# Patient Record
Sex: FEMALE | Race: WHITE | NOT HISPANIC OR LATINO | Employment: OTHER | URBAN - METROPOLITAN AREA
[De-identification: names, ages, dates, MRNs, and addresses within clinical notes are randomized per-mention and may not be internally consistent; named-entity substitution may affect disease eponyms.]

---

## 2019-08-21 PROBLEM — S41.109A ARM WOUND: Status: ACTIVE | Noted: 2018-04-18

## 2019-08-21 PROBLEM — Z71.3 DIETARY COUNSELING: Status: ACTIVE | Noted: 2019-08-21

## 2019-08-21 PROBLEM — Z51.81 ENCOUNTER FOR THERAPEUTIC DRUG MONITORING: Status: ACTIVE | Noted: 2019-08-21

## 2019-12-31 PROBLEM — R31.0 GROSS HEMATURIA: Status: ACTIVE | Noted: 2019-12-31

## 2020-02-24 PROBLEM — S41.109A ARM WOUND: Status: RESOLVED | Noted: 2018-04-18 | Resolved: 2020-02-24

## 2020-02-24 PROBLEM — C67.9 UROTHELIAL CARCINOMA OF BLADDER: Status: ACTIVE | Noted: 2020-01-03

## 2020-03-17 PROBLEM — C67.9 MALIGNANT NEOPLASM OF URINARY BLADDER: Status: ACTIVE | Noted: 2020-03-17

## 2020-03-18 PROBLEM — R50.9 FEVER: Status: RESOLVED | Noted: 2020-03-18 | Resolved: 2020-03-18

## 2020-03-18 PROBLEM — R50.9 FEVER: Status: ACTIVE | Noted: 2020-03-18

## 2020-04-05 PROBLEM — R73.9 HYPERGLYCEMIA: Status: ACTIVE | Noted: 2020-04-01

## 2020-05-13 PROBLEM — R31.0 GROSS HEMATURIA: Status: RESOLVED | Noted: 2019-12-31 | Resolved: 2020-05-13

## 2020-08-06 PROBLEM — N12 PYELONEPHRITIS: Status: ACTIVE | Noted: 2020-08-06

## 2020-08-06 PROBLEM — K85.90 ACUTE PANCREATITIS WITHOUT INFECTION OR NECROSIS: Status: ACTIVE | Noted: 2020-08-06

## 2020-08-09 PROBLEM — N12 PYELONEPHRITIS: Status: RESOLVED | Noted: 2020-08-06 | Resolved: 2020-08-09

## 2020-10-29 ENCOUNTER — HOSPITAL ENCOUNTER (OUTPATIENT)
Dept: SLEEP MEDICINE | Facility: HOSPITAL | Age: 80
Discharge: HOME | End: 2020-10-29
Attending: INTERNAL MEDICINE
Payer: MEDICARE

## 2020-10-29 DIAGNOSIS — G47.33 OBSTRUCTIVE SLEEP APNEA (ADULT) (PEDIATRIC): ICD-10-CM

## 2020-10-29 PROCEDURE — 95806 SLEEP STUDY UNATT&RESP EFFT: CPT

## 2020-11-16 PROBLEM — K85.90 ACUTE PANCREATITIS WITHOUT INFECTION OR NECROSIS: Status: RESOLVED | Noted: 2020-08-06 | Resolved: 2020-11-16

## 2020-11-17 PROBLEM — G47.33 OSA (OBSTRUCTIVE SLEEP APNEA): Status: ACTIVE | Noted: 2020-10-29

## 2021-07-01 ENCOUNTER — PATIENT MESSAGE (OUTPATIENT)
Dept: ADMINISTRATIVE | Facility: OTHER | Age: 81
End: 2021-07-01

## 2021-11-14 PROBLEM — Z51.81 ENCOUNTER FOR THERAPEUTIC DRUG MONITORING: Status: RESOLVED | Noted: 2019-08-21 | Resolved: 2021-11-14

## 2021-11-14 PROBLEM — Z71.3 DIETARY COUNSELING: Status: RESOLVED | Noted: 2019-08-21 | Resolved: 2021-11-14

## 2021-11-14 PROBLEM — Z63.79: Status: ACTIVE | Noted: 2021-11-14

## 2022-10-12 PROBLEM — S22.000A COMPRESSION FRACTURE OF THORACIC VERTEBRA: Status: ACTIVE | Noted: 2022-10-12

## 2023-03-17 DIAGNOSIS — Z01.89 PHYSICAL THERAPY EVALUATION, INITIAL: Primary | ICD-10-CM

## 2023-09-30 PROBLEM — N18.9 ACUTE ON CHRONIC KIDNEY FAILURE: Status: ACTIVE | Noted: 2023-09-30

## 2023-09-30 PROBLEM — N17.9 ACUTE ON CHRONIC KIDNEY FAILURE: Status: ACTIVE | Noted: 2023-09-30

## 2023-09-30 PROBLEM — R10.10 PAIN OF UPPER ABDOMEN: Status: ACTIVE | Noted: 2023-09-30

## 2023-10-01 PROBLEM — N18.4 CKD (CHRONIC KIDNEY DISEASE), STAGE IV: Status: ACTIVE | Noted: 2023-10-01

## 2023-10-01 PROBLEM — N17.9 ACUTE ON CHRONIC KIDNEY FAILURE: Status: RESOLVED | Noted: 2023-09-30 | Resolved: 2023-10-01

## 2023-10-01 PROBLEM — R93.2 ABNORMAL FINDINGS ON IMAGING OF BILIARY TRACT: Status: ACTIVE | Noted: 2023-10-01

## 2023-10-01 PROBLEM — R74.8 ABNORMAL LIVER ENZYMES: Status: ACTIVE | Noted: 2023-10-01

## 2023-10-01 PROBLEM — N18.9 ACUTE ON CHRONIC KIDNEY FAILURE: Status: RESOLVED | Noted: 2023-09-30 | Resolved: 2023-10-01

## 2023-10-03 ENCOUNTER — ANESTHESIA (OUTPATIENT)
Dept: ENDOSCOPY | Facility: HOSPITAL | Age: 83
DRG: 439 | End: 2023-10-03
Payer: MEDICARE

## 2023-10-03 ENCOUNTER — HOSPITAL ENCOUNTER (INPATIENT)
Facility: HOSPITAL | Age: 83
LOS: 1 days | Discharge: HOME | DRG: 439 | End: 2023-10-04
Attending: INTERNAL MEDICINE | Admitting: INTERNAL MEDICINE
Payer: MEDICARE

## 2023-10-03 ENCOUNTER — ANESTHESIA EVENT (OUTPATIENT)
Dept: ENDOSCOPY | Facility: HOSPITAL | Age: 83
DRG: 439 | End: 2023-10-03
Payer: MEDICARE

## 2023-10-03 DIAGNOSIS — I48.0 PAROXYSMAL A-FIB: ICD-10-CM

## 2023-10-03 DIAGNOSIS — Z91.89 AT RISK FOR LONG QT SYNDROME: ICD-10-CM

## 2023-10-03 DIAGNOSIS — R74.8 ABNORMAL LIVER ENZYMES: Primary | ICD-10-CM

## 2023-10-03 DIAGNOSIS — N18.4 CKD (CHRONIC KIDNEY DISEASE), STAGE IV: ICD-10-CM

## 2023-10-03 DIAGNOSIS — K85.10 ACUTE BILIARY PANCREATITIS: ICD-10-CM

## 2023-10-03 DIAGNOSIS — J43.1 PANLOBULAR EMPHYSEMA: ICD-10-CM

## 2023-10-03 PROBLEM — F41.8 ANXIETY WITH DEPRESSION: Status: ACTIVE | Noted: 2023-10-03

## 2023-10-03 LAB
ALBUMIN SERPL BCP-MCNC: 3.3 G/DL (ref 3.5–5.2)
ALP SERPL-CCNC: 173 U/L (ref 55–135)
ALT SERPL W/O P-5'-P-CCNC: 379 U/L (ref 10–44)
ANION GAP SERPL CALC-SCNC: 14 MMOL/L (ref 8–16)
AST SERPL-CCNC: 199 U/L (ref 10–40)
BASOPHILS # BLD AUTO: 0.07 K/UL (ref 0–0.2)
BASOPHILS NFR BLD: 0.7 % (ref 0–1.9)
BILIRUB SERPL-MCNC: 0.7 MG/DL (ref 0.1–1)
BUN SERPL-MCNC: 31 MG/DL (ref 8–23)
CALCIUM SERPL-MCNC: 9.5 MG/DL (ref 8.7–10.5)
CHLORIDE SERPL-SCNC: 111 MMOL/L (ref 95–110)
CO2 SERPL-SCNC: 15 MMOL/L (ref 23–29)
CREAT SERPL-MCNC: 2.2 MG/DL (ref 0.5–1.4)
DIFFERENTIAL METHOD: ABNORMAL
EOSINOPHIL # BLD AUTO: 0.3 K/UL (ref 0–0.5)
EOSINOPHIL NFR BLD: 2.5 % (ref 0–8)
ERYTHROCYTE [DISTWIDTH] IN BLOOD BY AUTOMATED COUNT: 13.6 % (ref 11.5–14.5)
EST. GFR  (NO RACE VARIABLE): 21.7 ML/MIN/1.73 M^2
GLUCOSE SERPL-MCNC: 99 MG/DL (ref 70–110)
HCT VFR BLD AUTO: 32.6 % (ref 37–48.5)
HGB BLD-MCNC: 10.8 G/DL (ref 12–16)
IMM GRANULOCYTES # BLD AUTO: 0.06 K/UL (ref 0–0.04)
IMM GRANULOCYTES NFR BLD AUTO: 0.6 % (ref 0–0.5)
LIPASE SERPL-CCNC: 164 U/L (ref 4–60)
LYMPHOCYTES # BLD AUTO: 1.2 K/UL (ref 1–4.8)
LYMPHOCYTES NFR BLD: 11.5 % (ref 18–48)
MAGNESIUM SERPL-MCNC: 1.6 MG/DL (ref 1.6–2.6)
MCH RBC QN AUTO: 29 PG (ref 27–31)
MCHC RBC AUTO-ENTMCNC: 33.1 G/DL (ref 32–36)
MCV RBC AUTO: 88 FL (ref 82–98)
MONOCYTES # BLD AUTO: 0.7 K/UL (ref 0.3–1)
MONOCYTES NFR BLD: 6.9 % (ref 4–15)
NEUTROPHILS # BLD AUTO: 7.9 K/UL (ref 1.8–7.7)
NEUTROPHILS NFR BLD: 77.8 % (ref 38–73)
NRBC BLD-RTO: 0 /100 WBC
PHOSPHATE SERPL-MCNC: 2.8 MG/DL (ref 2.7–4.5)
PLATELET # BLD AUTO: 294 K/UL (ref 150–450)
PMV BLD AUTO: 8.7 FL (ref 9.2–12.9)
POTASSIUM SERPL-SCNC: 3.6 MMOL/L (ref 3.5–5.1)
PROT SERPL-MCNC: 7.1 G/DL (ref 6–8.4)
RBC # BLD AUTO: 3.72 M/UL (ref 4–5.4)
SODIUM SERPL-SCNC: 140 MMOL/L (ref 136–145)
WBC # BLD AUTO: 10.15 K/UL (ref 3.9–12.7)

## 2023-10-03 PROCEDURE — 20600001 HC STEP DOWN PRIVATE ROOM

## 2023-10-03 PROCEDURE — 43259 PR ENDOSCOPIC ULTRASOUND EXAM: ICD-10-PCS | Mod: ,,, | Performed by: INTERNAL MEDICINE

## 2023-10-03 PROCEDURE — 93010 EKG 12-LEAD: ICD-10-PCS | Mod: ,,, | Performed by: INTERNAL MEDICINE

## 2023-10-03 PROCEDURE — 93005 ELECTROCARDIOGRAM TRACING: CPT

## 2023-10-03 PROCEDURE — 93010 ELECTROCARDIOGRAM REPORT: CPT | Mod: ,,, | Performed by: INTERNAL MEDICINE

## 2023-10-03 PROCEDURE — 37000008 HC ANESTHESIA 1ST 15 MINUTES: Performed by: INTERNAL MEDICINE

## 2023-10-03 PROCEDURE — 94761 N-INVAS EAR/PLS OXIMETRY MLT: CPT

## 2023-10-03 PROCEDURE — D9220A PRA ANESTHESIA: ICD-10-PCS | Mod: ANES,,, | Performed by: ANESTHESIOLOGY

## 2023-10-03 PROCEDURE — 99223 PR INITIAL HOSPITAL CARE,LEVL III: ICD-10-PCS | Mod: 25,GC,, | Performed by: INTERNAL MEDICINE

## 2023-10-03 PROCEDURE — 85025 COMPLETE CBC W/AUTO DIFF WBC: CPT | Performed by: HOSPITALIST

## 2023-10-03 PROCEDURE — 83735 ASSAY OF MAGNESIUM: CPT | Performed by: HOSPITALIST

## 2023-10-03 PROCEDURE — 83690 ASSAY OF LIPASE: CPT | Performed by: HOSPITALIST

## 2023-10-03 PROCEDURE — C9113 INJ PANTOPRAZOLE SODIUM, VIA: HCPCS | Performed by: HOSPITALIST

## 2023-10-03 PROCEDURE — 84100 ASSAY OF PHOSPHORUS: CPT | Performed by: HOSPITALIST

## 2023-10-03 PROCEDURE — 63600175 PHARM REV CODE 636 W HCPCS: Performed by: HOSPITALIST

## 2023-10-03 PROCEDURE — 36415 COLL VENOUS BLD VENIPUNCTURE: CPT | Performed by: HOSPITALIST

## 2023-10-03 PROCEDURE — D9220A PRA ANESTHESIA: Mod: ANES,,, | Performed by: ANESTHESIOLOGY

## 2023-10-03 PROCEDURE — 25000003 PHARM REV CODE 250: Performed by: STUDENT IN AN ORGANIZED HEALTH CARE EDUCATION/TRAINING PROGRAM

## 2023-10-03 PROCEDURE — D9220A PRA ANESTHESIA: ICD-10-PCS | Mod: CRNA,,, | Performed by: NURSE ANESTHETIST, CERTIFIED REGISTERED

## 2023-10-03 PROCEDURE — 43259 EGD US EXAM DUODENUM/JEJUNUM: CPT | Mod: ,,, | Performed by: INTERNAL MEDICINE

## 2023-10-03 PROCEDURE — 43259 EGD US EXAM DUODENUM/JEJUNUM: CPT | Performed by: INTERNAL MEDICINE

## 2023-10-03 PROCEDURE — 25000003 PHARM REV CODE 250: Performed by: NURSE ANESTHETIST, CERTIFIED REGISTERED

## 2023-10-03 PROCEDURE — 37000009 HC ANESTHESIA EA ADD 15 MINS: Performed by: INTERNAL MEDICINE

## 2023-10-03 PROCEDURE — 99223 1ST HOSP IP/OBS HIGH 75: CPT | Mod: 25,GC,, | Performed by: INTERNAL MEDICINE

## 2023-10-03 PROCEDURE — 63600175 PHARM REV CODE 636 W HCPCS: Performed by: NURSE ANESTHETIST, CERTIFIED REGISTERED

## 2023-10-03 PROCEDURE — 80053 COMPREHEN METABOLIC PANEL: CPT | Performed by: HOSPITALIST

## 2023-10-03 PROCEDURE — 99223 1ST HOSP IP/OBS HIGH 75: CPT | Mod: ,,, | Performed by: HOSPITALIST

## 2023-10-03 PROCEDURE — D9220A PRA ANESTHESIA: Mod: CRNA,,, | Performed by: NURSE ANESTHETIST, CERTIFIED REGISTERED

## 2023-10-03 PROCEDURE — 25000003 PHARM REV CODE 250: Performed by: HOSPITALIST

## 2023-10-03 PROCEDURE — 99223 PR INITIAL HOSPITAL CARE,LEVL III: ICD-10-PCS | Mod: ,,, | Performed by: HOSPITALIST

## 2023-10-03 RX ORDER — PANTOPRAZOLE SODIUM 40 MG/10ML
40 INJECTION, POWDER, LYOPHILIZED, FOR SOLUTION INTRAVENOUS DAILY
Status: DISCONTINUED | OUTPATIENT
Start: 2023-10-03 | End: 2023-10-04 | Stop reason: HOSPADM

## 2023-10-03 RX ORDER — PROPOFOL 10 MG/ML
VIAL (ML) INTRAVENOUS
Status: DISCONTINUED | OUTPATIENT
Start: 2023-10-03 | End: 2023-10-03

## 2023-10-03 RX ORDER — HYDRALAZINE HYDROCHLORIDE 20 MG/ML
10 INJECTION INTRAMUSCULAR; INTRAVENOUS EVERY 8 HOURS PRN
Status: DISCONTINUED | OUTPATIENT
Start: 2023-10-03 | End: 2023-10-04 | Stop reason: HOSPADM

## 2023-10-03 RX ORDER — FENTANYL CITRATE 50 UG/ML
INJECTION, SOLUTION INTRAMUSCULAR; INTRAVENOUS
Status: DISCONTINUED | OUTPATIENT
Start: 2023-10-03 | End: 2023-10-03

## 2023-10-03 RX ORDER — DOFETILIDE 0.25 MG/1
250 CAPSULE ORAL EVERY 12 HOURS
Status: DISCONTINUED | OUTPATIENT
Start: 2023-10-03 | End: 2023-10-04 | Stop reason: HOSPADM

## 2023-10-03 RX ORDER — MORPHINE SULFATE 2 MG/ML
2 INJECTION, SOLUTION INTRAMUSCULAR; INTRAVENOUS EVERY 4 HOURS PRN
Status: DISCONTINUED | OUTPATIENT
Start: 2023-10-03 | End: 2023-10-04 | Stop reason: HOSPADM

## 2023-10-03 RX ORDER — AMOXICILLIN 250 MG
2 CAPSULE ORAL 2 TIMES DAILY PRN
Status: DISCONTINUED | OUTPATIENT
Start: 2023-10-03 | End: 2023-10-04 | Stop reason: HOSPADM

## 2023-10-03 RX ORDER — ONDANSETRON 2 MG/ML
4 INJECTION INTRAMUSCULAR; INTRAVENOUS EVERY 6 HOURS PRN
Status: DISCONTINUED | OUTPATIENT
Start: 2023-10-03 | End: 2023-10-04 | Stop reason: HOSPADM

## 2023-10-03 RX ORDER — SODIUM CHLORIDE 0.9 % (FLUSH) 0.9 %
10 SYRINGE (ML) INJECTION
Status: DISCONTINUED | OUTPATIENT
Start: 2023-10-03 | End: 2023-10-04 | Stop reason: HOSPADM

## 2023-10-03 RX ORDER — FENTANYL CITRATE 50 UG/ML
25 INJECTION, SOLUTION INTRAMUSCULAR; INTRAVENOUS EVERY 5 MIN PRN
Status: DISCONTINUED | OUTPATIENT
Start: 2023-10-03 | End: 2023-10-03 | Stop reason: HOSPADM

## 2023-10-03 RX ORDER — SERTRALINE HYDROCHLORIDE 25 MG/1
50 TABLET, FILM COATED ORAL DAILY
Status: DISCONTINUED | OUTPATIENT
Start: 2023-10-03 | End: 2023-10-04 | Stop reason: HOSPADM

## 2023-10-03 RX ORDER — LORAZEPAM 0.5 MG/1
0.5 TABLET ORAL EVERY 6 HOURS PRN
Status: DISCONTINUED | OUTPATIENT
Start: 2023-10-03 | End: 2023-10-04 | Stop reason: HOSPADM

## 2023-10-03 RX ORDER — ONDANSETRON 2 MG/ML
4 INJECTION INTRAMUSCULAR; INTRAVENOUS DAILY PRN
Status: DISCONTINUED | OUTPATIENT
Start: 2023-10-03 | End: 2023-10-03 | Stop reason: HOSPADM

## 2023-10-03 RX ORDER — ISOSORBIDE MONONITRATE 60 MG/1
60 TABLET, EXTENDED RELEASE ORAL NIGHTLY
Status: DISCONTINUED | OUTPATIENT
Start: 2023-10-03 | End: 2023-10-04 | Stop reason: HOSPADM

## 2023-10-03 RX ORDER — POLYETHYLENE GLYCOL 3350 17 G/17G
17 POWDER, FOR SOLUTION ORAL DAILY
Status: DISCONTINUED | OUTPATIENT
Start: 2023-10-04 | End: 2023-10-04 | Stop reason: HOSPADM

## 2023-10-03 RX ORDER — NIFEDIPINE 30 MG/1
30 TABLET, EXTENDED RELEASE ORAL DAILY
Status: DISCONTINUED | OUTPATIENT
Start: 2023-10-03 | End: 2023-10-04 | Stop reason: HOSPADM

## 2023-10-03 RX ORDER — SODIUM CHLORIDE 0.9 % (FLUSH) 0.9 %
10 SYRINGE (ML) INJECTION
Status: DISCONTINUED | OUTPATIENT
Start: 2023-10-03 | End: 2023-10-03 | Stop reason: HOSPADM

## 2023-10-03 RX ORDER — METOPROLOL SUCCINATE 50 MG/1
50 TABLET, EXTENDED RELEASE ORAL 2 TIMES DAILY
Status: DISCONTINUED | OUTPATIENT
Start: 2023-10-03 | End: 2023-10-04 | Stop reason: HOSPADM

## 2023-10-03 RX ORDER — TALC
6 POWDER (GRAM) TOPICAL NIGHTLY PRN
Status: DISCONTINUED | OUTPATIENT
Start: 2023-10-03 | End: 2023-10-04 | Stop reason: HOSPADM

## 2023-10-03 RX ORDER — ACETAMINOPHEN 500 MG
500 TABLET ORAL EVERY 6 HOURS PRN
Status: DISCONTINUED | OUTPATIENT
Start: 2023-10-03 | End: 2023-10-04 | Stop reason: HOSPADM

## 2023-10-03 RX ORDER — SODIUM CHLORIDE, SODIUM LACTATE, POTASSIUM CHLORIDE, CALCIUM CHLORIDE 600; 310; 30; 20 MG/100ML; MG/100ML; MG/100ML; MG/100ML
INJECTION, SOLUTION INTRAVENOUS CONTINUOUS
Status: DISCONTINUED | OUTPATIENT
Start: 2023-10-03 | End: 2023-10-03

## 2023-10-03 RX ORDER — BENZONATATE 100 MG/1
100 CAPSULE ORAL 3 TIMES DAILY PRN
Status: DISCONTINUED | OUTPATIENT
Start: 2023-10-03 | End: 2023-10-04 | Stop reason: HOSPADM

## 2023-10-03 RX ADMIN — ISOSORBIDE MONONITRATE 60 MG: 60 TABLET, EXTENDED RELEASE ORAL at 08:10

## 2023-10-03 RX ADMIN — LORAZEPAM 0.5 MG: 0.5 TABLET ORAL at 08:10

## 2023-10-03 RX ADMIN — Medication 6 MG: at 02:10

## 2023-10-03 RX ADMIN — SODIUM CHLORIDE: 0.9 INJECTION, SOLUTION INTRAVENOUS at 02:10

## 2023-10-03 RX ADMIN — SODIUM CHLORIDE, POTASSIUM CHLORIDE, SODIUM LACTATE AND CALCIUM CHLORIDE: 600; 310; 30; 20 INJECTION, SOLUTION INTRAVENOUS at 02:10

## 2023-10-03 RX ADMIN — FENTANYL CITRATE 15 MCG: 50 INJECTION, SOLUTION INTRAMUSCULAR; INTRAVENOUS at 02:10

## 2023-10-03 RX ADMIN — HYDRALAZINE HYDROCHLORIDE 10 MG: 20 INJECTION, SOLUTION INTRAMUSCULAR; INTRAVENOUS at 02:10

## 2023-10-03 RX ADMIN — PIPERACILLIN AND TAZOBACTAM 4.5 G: 4; .5 INJECTION, POWDER, LYOPHILIZED, FOR SOLUTION INTRAVENOUS; PARENTERAL at 02:10

## 2023-10-03 RX ADMIN — PROPOFOL 30 MG: 10 INJECTION, EMULSION INTRAVENOUS at 02:10

## 2023-10-03 RX ADMIN — METOPROLOL SUCCINATE 50 MG: 50 TABLET, EXTENDED RELEASE ORAL at 08:10

## 2023-10-03 RX ADMIN — FENTANYL CITRATE 10 MCG: 50 INJECTION, SOLUTION INTRAMUSCULAR; INTRAVENOUS at 02:10

## 2023-10-03 RX ADMIN — BENZONATATE 100 MG: 100 CAPSULE ORAL at 06:10

## 2023-10-03 RX ADMIN — NIFEDIPINE 30 MG: 30 TABLET, FILM COATED, EXTENDED RELEASE ORAL at 08:10

## 2023-10-03 RX ADMIN — DOFETILIDE 250 MCG: 250 CAPSULE ORAL at 08:10

## 2023-10-03 RX ADMIN — Medication 6 MG: at 08:10

## 2023-10-03 RX ADMIN — SERTRALINE HYDROCHLORIDE 50 MG: 25 TABLET ORAL at 08:10

## 2023-10-03 RX ADMIN — PANTOPRAZOLE SODIUM 40 MG: 40 INJECTION, POWDER, FOR SOLUTION INTRAVENOUS at 08:10

## 2023-10-03 NOTE — ASSESSMENT & PLAN NOTE
-presents as a transfer from [facility] for management of acute biliary pancreatitis  -elevated Lipase and abnormal LFTs   -lipase and LFTs down trending    -CT showed intra and extrahepatic biliary dilation   -abdominal pain controlled with prn morphine  -continue empiric zosyn    -npo, mIVF pending AES evaluation for EUS/ERCP   -consult AES

## 2023-10-03 NOTE — SUBJECTIVE & OBJECTIVE
Past Medical History:   Diagnosis Date    Allergic sinusitis     Anemia     Arm wound 04/18/2018    UPPER RIGHT ARM, SLIGHT REDNESS NO DRAINAGE    Blind right eye     retinal detachments, 5 surgeries Dr. Mamadou HURD    Cataract     LEFT EYE    CKD (chronic kidney disease) stage 3, GFR 30-59 ml/min     Sees Mora    Control of atrial fibrillation with pacemaker 10/21/2015    COPD (chronic obstructive pulmonary disease)     quit 2015    Degenerative joint disease (DJD) of lumbar spine     bulging discs L3-4    Essential hypertension 10/21/2015    Former smoker     50 Pk/yr; stopped 3/2015    GERD (gastroesophageal reflux disease)     Gross hematuria 12/31/2019    Hematuria 03/2021    High cholesterol     Hyperglycemia 04/2020    Hypertension         Pacemaker     Paroxysmal A-fib     PVD (peripheral vascular disease)     iliac stents    Rectocele     SVT (supraventricular tachycardia)     DR APARICIO    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 Samaniego and referred to [facility]    UTI (urinary tract infection)     Vaginal vault prolapse     Vitamin D deficiency        Past Surgical History:   Procedure Laterality Date    ABDOMINAL SACROCOLPOPEXY  2014    ANTERIOR VAGINAL REPAIR      APPENDECTOMY      BLADDER FULGURATION  1/2/2020    Procedure: FULGURATION, BLADDER;  Surgeon: Jorje Samaniego MD;  Location: [facility] OR;  Service: Urology;;    BLADDER POLYP      CARDIAC PACEMAKER PLACEMENT  7/2015    CARPAL TUNNEL RELEASE Right 2012    Vj    CATARACT EXTRACTION Right     COLONOSCOPY      CYSTOSCOPY W/ RETROGRADES Bilateral 1/2/2020    Procedure: CYSTOSCOPY WITH RETROGRADE PYELOGRAM;  Surgeon: Jorje Samaniego MD;  Location: [facility] OR;  Service: Urology;  Laterality: Bilateral;    CYSTOSCOPY W/ RETROGRADES Bilateral 3/24/2021    Procedure: CYSTOSCOPY WITH RETROGRADE PYELOGRAM;  Surgeon: Jorje Samaniego MD;  Location: [facility] OR;  Service: Urology;  Laterality: Bilateral;    DIGITAL RECTAL EXAMINATION UNDER  "ANESTHESIA N/A 3/24/2021    Procedure: EXAM UNDER ANESTHESIA, DIGITAL, RECTUM;  Surgeon: Jorje Samaniego MD;  Location: [facility] OR;  Service: Urology;  Laterality: N/A;    DILATION OF URETHRA N/A 1/2/2020    Procedure: DILATION, URETHRA;  Surgeon: Jorje Samaniego MD;  Location: [facility] OR;  Service: Urology;  Laterality: N/A;    DILATION OF URETHRA N/A 3/24/2021    Procedure: DILATION, URETHRA;  Surgeon: Jorje Samaniego MD;  Location: [facility] OR;  Service: Urology;  Laterality: N/A;    TIEN      X 3    ESOPHAGOGASTRODUODENOSCOPY      FEMORAL STENTS      X 2    FIXATION KYPHOPLASTY N/A 10/25/2022    Procedure: KYPHOPLASTY T11;  Surgeon: Alli Bazan Jr., MD;  Location: [facility] OR;  Service: Orthopedics;  Laterality: N/A;  7am per Monica    HERNIA REPAIR      RIH, UMBILICAL    HYSTERECTOMY      LAPAROSCOPIC CHOLECYSTECTOMY  2010    E. Steve    OOPHORECTOMY      Ovarian cyst    RECTOCELE REPAIR      Aden    RETINAL DETACHMENT SURGERY Right     Mamadou 5 surgeries    TOTAL ABDOMINAL HYSTERECTOMY W/ BILATERAL SALPINGOOPHORECTOMY  1978    BSO/prolapse    URETHRAL SLING         Review of patient's allergies indicates:   Allergen Reactions    Doxycycline      pancreatitis    Levaquin [levofloxacin]      hives    Axid [nizatidine]      NOT SURE OF REACTION TO MED    Cyclobenzaprine      NOT SURE OF REACTION    Liquibid d-r [phenylephrine-guaifenesin]      NOT SURE REACTION    Macrodantin [nitrofurantoin macrocrystalline] Other (See Comments)     TERRIBLE BACK ACHE    Prednisone Other (See Comments)     SEVERE PAIN IN 'STOMACH"  AND BLOATED    Adhesive      USE PAPER TAPE     Family History       Problem Relation (Age of Onset)    Alcohol abuse Brother    Deep vein thrombosis Mother    Dementia Brother, Sister    Heart disease Mother    Prostate cancer Father          Tobacco Use    Smoking status: Former     Current packs/day: 0.00     Average packs/day: 0.8 packs/day for 50.0 years (37.5 ttl pk-yrs)     Types: " Cigarettes     Start date: 3/19/1965     Quit date: 3/19/2015     Years since quittin.5    Smokeless tobacco: Never   Substance and Sexual Activity    Alcohol use: Not Currently    Drug use: No    Sexual activity: Not Currently     Partners: Male     Birth control/protection: Surgical     Review of Systems   Constitutional:  Negative for chills and fever.   Gastrointestinal:  Positive for abdominal pain. Negative for nausea and vomiting.   Skin:  Negative for color change.     Objective:     Vital Signs (Most Recent):  Temp: 98.1 °F (36.7 °C) (10/03/23 0753)  Pulse: 70 (10/03/23 0753)  Resp: 18 (10/03/23 0753)  BP: (!) 175/76 (10/03/23 0753)  SpO2: 95 % (10/03/23 0753) Vital Signs (24h Range):  Temp:  [96 °F (35.6 °C)-98.4 °F (36.9 °C)] 98.1 °F (36.7 °C)  Pulse:  [69-70] 70  Resp:  [18-20] 18  SpO2:  [94 %-99 %] 95 %  BP: (162-183)/(72-88) 175/76     Weight: 53.8 kg (118 lb 9.7 oz) (10/03/23 0118)  Body mass index is 20.36 kg/m².    No intake or output data in the 24 hours ending 10/03/23 0837    Lines/Drains/Airways       Peripheral Intravenous Line  Duration                  Peripheral IV - Single Lumen 10/02/23 1521 20 G Left Antecubital <1 day                     Physical Exam  Vitals reviewed.   Constitutional:       General: She is not in acute distress.     Appearance: She is not ill-appearing.   Eyes:      General: No scleral icterus.     Extraocular Movements: Extraocular movements intact.   Cardiovascular:      Rate and Rhythm: Normal rate.   Pulmonary:      Effort: Pulmonary effort is normal. No respiratory distress.   Abdominal:      General: There is no distension.      Tenderness: There is abdominal tenderness (epigastrium). There is no guarding.   Skin:     Coloration: Skin is not jaundiced.   Neurological:      Mental Status: She is alert. Mental status is at baseline.          Significant Labs:  All pertinent lab results from the last 24 hours have been reviewed.    Significant Imaging:  Imaging  results within the past 24 hours have been reviewed.

## 2023-10-03 NOTE — NURSING
"Pt arrived via stretcher attended by 2 [facility] ambulance personnel. Pt on RA, no respiratory distress, alert and oriented x4, [place], blind in right eye. Denies pain at this time. Pt arrived HTN see vs and MAR. 330am pt yells for nurse, nurse enters room-pt states, "when I close my eyes it's like I see people dancing, but when I open them I don't see them." Pt requests all lights on, all lights are on, tv on, call bell placed within reach but placed on top of blankets so pt can see easier than when it was under her blanket but within reach.   "

## 2023-10-03 NOTE — TRANSFER OF CARE
"Anesthesia Transfer of Care Note    Patient: Ally Navarro    Procedure(s) Performed: Procedure(s) (LRB):  ERCP (ENDOSCOPIC RETROGRADE CHOLANGIOPANCREATOGRAPHY) (N/A)  ULTRASOUND, UPPER GI TRACT, ENDOSCOPIC (N/A)    Anesthesia Type: general    Transport from OR: Transported from OR on 6-10 L/min O2 by face mask with adequate spontaneous ventilation    Post pain: adequate analgesia    Post assessment: no apparent anesthetic complications    Post vital signs: stable    Level of consciousness: awake    Nausea/Vomiting: no nausea/vomiting    Complications: none    Transfer of care protocol was followed      Last vitals:   Visit Vitals  BP (!) 178/79   Pulse 77   Temp 36.7 °C (98.1 °F) (Temporal)   Resp 18   Ht 5' 4" (1.626 m)   Wt 53.8 kg (118 lb 9.7 oz)   SpO2 99%   Breastfeeding No   BMI 20.36 kg/m²     "

## 2023-10-03 NOTE — PROGRESS NOTES
Pharmacist Renal Dose Adjustment Note    Ally Navarro is a 82 y.o. female being treated with the medication piperacillin/tazobactam    Patient Data:    Vital Signs (Most Recent):    Vital Signs (72h Range):  Temp:  [96 °F (35.6 °C)-98.7 °F (37.1 °C)]   Pulse:  [69-72]   Resp:  [16-20]   BP: (116-194)/(56-86)   SpO2:  [93 %-99 %]      Recent Labs   Lab 09/30/23  1652 10/01/23  0555 10/02/23  0459   CREATININE 2.09* 1.79* 2.13*     Serum creatinine: 2.13 mg/dL (H) 10/02/23 0459  Estimated creatinine clearance: 17 mL/min (A)    Medication:piperacillin/tazobactam dose: 3.375mg frequency every 8 hours will be changed to medication:piperacillin/tazobactam dose:4.5 frequency:every 12 hours    Pharmacist's Name: Meño Quevedo  Pharmacist's Extension: [phone]

## 2023-10-03 NOTE — ASSESSMENT & PLAN NOTE
82 year old female with COPD, atrial fibrillation/pacemaker placement, prior cholecystectomy, bladder cancer transferred from OSH for AES evaluation of biliary pancreatitis. She was unable to undergo MRCP to further evaluate ductal dilation due to pacemaker incompatibility. Liver enzymes downtrending and abdominal pain has improved. She is not on any blood thinners.     Recommendations:  - Keep NPO; plan for EUS +/- ERCP today.

## 2023-10-03 NOTE — ASSESSMENT & PLAN NOTE
Patient with Paroxysmal (<7 days) atrial fibrillation which is controlled currently with Beta Blocker and dofetilide . Patient is currently in sinus rhythm.[id number] Score: 3. . Anticoagulation not indicated due to h/o bleeding.

## 2023-10-03 NOTE — PROVATION PATIENT INSTRUCTIONS
Discharge Summary/Instructions after an Endoscopic Procedure  Patient Name: Ally Navarro  Patient MRN: [id number]  Patient YOB: 1940  Tuesday, October 3, 2023  Meño Simms MD  Dear patient,  As a result of recent federal legislation (The Federal Cures Act), you may   receive lab or pathology results from your procedure in your [facility]   account before your physician is able to contact you. Your physician or   their representative will relay the results to you with their   recommendations at their soonest availability.  Thank you,  RESTRICTIONS:  During your procedure today, you received medications for sedation.  These   medications may affect your judgment, balance and coordination.  Therefore,   for 24 hours, you have the following restrictions:   - DO NOT drive a car, operate machinery, make legal/financial decisions,   sign important papers or drink alcohol.    ACTIVITY:  Today: no heavy lifting, straining or running due to procedural   sedation/anesthesia.  The following day: return to full activity including work.  DIET:  Eat and drink normally unless instructed otherwise.     TREATMENT FOR COMMON SIDE EFFECTS:  - Mild abdominal pain, nausea, belching, bloating or excessive gas:  rest,   eat lightly and use a heating pad.  - Sore Throat: treat with throat lozenges and/or gargle with warm salt   water.  - Because air was used during the procedure, expelling large amounts of air   from your rectum or belching is normal.  - If a bowel prep was taken, you may not have a bowel movement for 1-3 days.    This is normal.  SYMPTOMS TO WATCH FOR AND REPORT TO YOUR PHYSICIAN:  1. Abdominal pain or bloating, other than gas cramps.  2. Chest pain.  3. Back pain.  4. Signs of infection such as: chills or fever occurring within 24 hours   after the procedure.  5. Rectal bleeding, which would show as bright red, maroon, or black stools.   (A tablespoon of blood from the rectum is not serious, especially if    hemorrhoids are present.)  6. Vomiting.  7. Weakness or dizziness.  GO DIRECTLY TO THE NEAREST EMERGENCY ROOM IF YOU HAVE ANY OF THE FOLLOWING:      Difficulty breathing              Chills and/or fever over 101 F   Persistent vomiting and/or vomiting blood   Severe abdominal pain   Severe chest pain   Black, tarry stools   Bleeding- more than one tablespoon   Any other symptom or condition that you feel may need urgent attention  Your doctor recommends these additional instructions:  If any biopsies were taken, your doctors clinic will contact you in 1 to 2   weeks with any results.  - Discharge patient to home.   - Resume previous diet.   - Continue present medications.  For questions, problems or results please call your physician - Meño Simms MD at Work:  [phone].  [facility], EMERGENCY ROOM PHONE NUMBER: [phone]  IF A COMPLICATION OR EMERGENCY SITUATION ARISES AND YOU ARE UNABLE TO REACH   YOUR PHYSICIAN - GO DIRECTLY TO THE EMERGENCY ROOM.  Meño Simms MD  10/3/2023 2:44:05 PM  This report has been verified and signed electronically.  Dear patient,  As a result of recent federal legislation (The Federal Cures Act), you may   receive lab or pathology results from your procedure in your [facility]   account before your physician is able to contact you. Your physician or   their representative will relay the results to you with their   recommendations at their soonest availability.  Thank you,  PROVATION

## 2023-10-03 NOTE — HPI
Ally Navarro is an 82 year old female for whom AES is consulted for biliary pancreatitis. She has a medical history of chronic kidney disease, COPD, atrial fibrillation/pacemaker placement, prior cholecystectomy, bladder cancer. History provided by patient and chart review.     Pt initially presented to [facility] on 9/30 with epigastric abdominal pain associated with nausea and decreased appetite. Laboratory workup showed a leukocytosis (13), hyperbilirubinemia (1.5), elevated liver enzymes (AST 1872, ) and elevated lipase 56229, otherwise stable H&H. CTAP from 9/30 showed intra and extrahepatic ductal dilation. She was started on Zosyn for inatrabdominal coverage. She was unable to undergo MRCP but her pacemaker is not MRI compatible. She was ultimately transferred to [facility] for EUS. On arrival, she was afebrile and HDS. Laboratory workup notable for downtrending liver enzymes (,  and ) with a normal T bili. She states the pain has overall improved since it started.

## 2023-10-03 NOTE — NURSING TRANSFER
Nursing Transfer Note      10/3/2023   3:49 PM  Reason patient is being transferred: post-procedure    Transfer To: [id number]    Transfer via stretcher    Transfer with none    Transported by PCT    Order for Tele Monitor? yes    Medicines sent: none    Any special needs or follow-up needed: routine    Patient belongings transferred with patient: No    Chart send with patient: Yes    Notified: son

## 2023-10-03 NOTE — PLAN OF CARE
[facility] ([place] [address])  Initial Discharge Assessment       Primary Care Provider: Stevo Berry MD    Admission Diagnosis: Acute biliary pancreatitis [K85.10]    Admission Date: 10/3/2023  Expected Discharge Date: 10/5/2023    Transition of Care Barriers: None    Payor: MEDICARE / Plan: MEDICARE PART A & B / Product Type: Government /     Extended Emergency Contact Information  Primary Emergency Contact: Emmanuel Navarro   [place]  Mobile Phone: [phone]  Relation: Son  Secondary Emergency Contact: Michelet Navarro  Mobile Phone: [phone]  Relation: Son  Preferred language: English   needed? No    Discharge Plan A: Home with family  Discharge Plan B: [facility]      "[facility] ST[id number] - [place], [place]  [address]  [address] [place] [address]  Phone: [phone] Fax: [phone]      Initial Assessment (most recent)       Adult Discharge Assessment - 10/03/23 1500          Discharge Assessment    Assessment Type Discharge Planning Assessment     Source of Information patient     When was your last doctors appointment? --   6 months ago    Reason For Admission acute biliary pancreatitis     People in Home alone     Facility Arrived From: [facility]     Do you expect to return to your current living situation? Yes     Do you have help at home or someone to help you manage your care at home? No     Prior to hospitilization cognitive status: Alert/Oriented     Current cognitive status: Alert/Oriented     Equipment Currently Used at Home walker, rolling     Readmission within 30 days? Yes     Patient currently being followed by outpatient case management? No     Do you currently have service(s) that help you manage your care at home? No     Do you take prescription medications? Yes     Do you have prescription coverage? Yes     Coverage Medicare A& B     Do you have any problems affording  any of your prescribed medications? No     Is the patient taking medications as prescribed? yes     Who is going to help you get home at discharge? Emmanuel Navarro (Son)   [phone] (Mobile)     How do you get to doctors appointments? family or friend will provide     Are you on dialysis? No     Do you take coumadin? No     DME Needed Upon Discharge  other (see comments)   TBD    Discharge Plan discussed with: Patient     Transition of Care Barriers None     Discharge Plan A Home with family     Discharge Plan B Home Health        Physical Activity    On average, how many days per week do you engage in moderate to strenuous exercise (like a brisk walk)? 5 days     On average, how many minutes do you engage in exercise at this level? 20 min        Financial Resource Strain    How hard is it for you to pay for the very basics like food, housing, medical care, and heating? Not hard at all        Housing Stability    In the last 12 months, was there a time when you were not able to pay the mortgage or rent on time? No     In the last 12 months, was there a time when you did not have a steady place to sleep or slept in a shelter (including now)? No        Transportation Needs    In the past 12 months, has lack of transportation kept you from medical appointments or from getting medications? No     In the past 12 months, has lack of transportation kept you from meetings, work, or from getting things needed for daily living? No        Food Insecurity    Within the past 12 months, you worried that your food would run out before you got the money to buy more. Never true     Within the past 12 months, the food you bought just didn't last and you didn't have money to get more. Never true        Stress    Do you feel stress - tense, restless, nervous, or anxious, or unable to sleep at night because your mind is troubled all the time - these days? To some extent        Social Connections    In a typical week, how many times do  you talk on the phone with family, friends, or neighbors? Three times a week     How often do you get together with friends or relatives? Twice a week     How often do you attend [employer] or [employer]ism services? 1 to 4 times per year     Do you belong to any clubs or organizations such as [employer] groups, unions, fraternal or athletic groups, or school groups? Yes     How often do you attend meetings of the clubs or organizations you belong to? 1 to 4 times per year     Are you , , , , never , or living with a partner?         Alcohol Use    Q1: How often do you have a drink containing alcohol? Never     Q2: How many drinks containing alcohol do you have on a typical day when you are drinking? Patient does not drink     Q3: How often do you have six or more drinks on one occasion? Never                     Readmission Assessment (most recent)       Readmission Assessment - 10/03/23 1641          Readmission    Why were you hospitalized in the last 30 days? 9/30/23-10/02/23     Why were you readmitted? Related to previous admission     When you left the hospital how did you feel? I felt okay     When you left the hospital where did you go? Home Alone     Did patient/caregiver refused recommended DC plan? No     Tell me about what happened between when you left the hospital and the day you returned. I was in pain and nausea and vomiting     When did you start not feeling well? This pst sunday     Did you try to manage your symptoms your self? Yes     What did you do? I took my medications     Did you call anyone? Yes     Who did you call? PCP     Did you try to see or did see a doctor or nurse before you came? Yes     Were you seen? No     Why? Did not want to see NP/or partner   My son took me to the ER    Did you have  a follow-up appointment on discharge? Yes     Did you go? Yes     Was this a planned readmission? No                      CM met with patient at bedside to  complete discharge planning assessment.  Patient alert and oriented xs 4.  Patient verified all demographic information on facesheet is correct.  Patient verified PCP is Dr Stevo Berry. .  Patient verified primary health insurance is MEDICARE - MEDICARE PART A & B and [employer]. Patient is agreeable with bedside medication delivery.   Patient is not  active with  home health and has listed DME.  Patient with NO POA or Living Will.  Patient not on dialysis or medication coumadin.  Patient with no 30 day admission.  Patient with no financial issues at this time.  Patient family/friend will provide transportation upon discharge from facility.  Patient will have assistance from her family  upon discharge Patient independent with ADLs. Patient  lives alone.  All questions answered regarding Case Management Discharge Planning, patient verbalized understanding.  Discharge booklet with CM's contact information given to patient.      Care at home referred     Leighann Lynch RN  Case Management  [facility]  [phone]

## 2023-10-03 NOTE — HPI
82-year-old female with a history of anemia, chronic kidney disease IV (baseline Cr 2.5 - 3), COPD, atrial fibrillation/pacemaker placement (on dofetilide, not on AC) , hypertension, hyperlipidemia,  bladder cancer on remission, remote cholecystectomy, MCI who admitted to [facility] on September 30 with upper abdominal pain, nausea, and poor appetite. No fever noted.  Lipase was elevated.  CT of the abdomen and pelvis showed intra/extrahepatic ductal dilatation.  From September 30 to October 1, creatinine decreased from 2.09 down to 1.79.  AST increased from 125 to 1872, and ALT increased from 51 up to 995.  Total bilirubin increased from 0.8 up to 1.5. She was seen by Cardiology who noted that her pacemaker is not MRI compatible. She was seen by Gastroenterology, and it was felt that she needed further endoscopic evaluation. Abdominal pain has persisted, and she is developing jaundice by report.  She remains on Zosyn.  Blood pressure has remained elevated during her stay, and medications are being adjusted along with the addition of p.r.n. IV antihypertensives.  With concern for biliary pancreatitis, will plan transfer to Hospital Medicine at [facility] in step-down status for Biliary Service evaluation.     October 1:  Lipase 93187, sodium 139, potassium 5 chloride 110, CO2 22, BUN 55, creatinine 1.79, glucose 129, total bilirubin 1.5, AST 1872, , white blood cells 8.2, hemoglobin 10.8, hematocrit 32.9, platelets 304     September 30:  CT abdomen and pelvis showed intra and extrahepatic ductal dilatation.  Prior cholecystectomy.  Small fat containing umbilical hernia.  Diverticulosis no diverticulitis.     EKG showed an atrial paced rhythm with nonspecific T-wave abnormality and ventricular rate 70.     During my interview upon arrival to [facility], patient is awake, conversant and not in distress. Son is present at bedside.

## 2023-10-03 NOTE — TREATMENT PLAN
GI Post Procedure Treatment Plan  Procedure Performed: EUS    Impression:    - Normal esophagus.                          - Normal stomach.                          - Normal examined duodenum.                          - There was no sign of significant pathology in                          the pancreatic head.                          - No specimens collected.     Recommendation:                                 - Discharge patient to home.                          - Resume previous diet.                          - Continue present medications.     - We will sign-off.    Jelly Santiago MD  Gastroenterology, PGY-4

## 2023-10-03 NOTE — H&P
[facility] - Intensive Care ([address] Medicine  History & Physical    Patient Name: Ally Navarro  MRN: [id number]  Patient Class: IP- Inpatient  Admission Date: 10/3/2023  Attending Physician: Erika Fine MD   Primary Care Provider: Stevo Berry MD         Patient information was obtained from patient and outside records .     Subjective:     Principal Problem:Acute biliary pancreatitis    Chief Complaint: No chief complaint on file.       HPI: 82-year-old female with a history of anemia, chronic kidney disease IV (baseline Cr 2.5 - 3), COPD, atrial fibrillation/pacemaker placement (on dofetilide, not on AC) , hypertension, hyperlipidemia,  bladder cancer on remission, remote cholecystectomy, MCI who admitted to [facility] on September 30 with upper abdominal pain, nausea, and poor appetite. No fever noted.  Lipase was elevated.  CT of the abdomen and pelvis showed intra/extrahepatic ductal dilatation.  From September 30 to October 1, creatinine decreased from 2.09 down to 1.79.  AST increased from 125 to 1872, and ALT increased from 51 up to 995.  Total bilirubin increased from 0.8 up to 1.5. She was seen by Cardiology who noted that her pacemaker is not MRI compatible. She was seen by Gastroenterology, and it was felt that she needed further endoscopic evaluation. Abdominal pain has persisted, and she is developing jaundice by report.  She remains on Zosyn.  Blood pressure has remained elevated during her stay, and medications are being adjusted along with the addition of p.r.n. IV antihypertensives.  With concern for biliary pancreatitis, will plan transfer to Hospital Medicine at [facility] in step-down status for Biliary Service evaluation.     October 1:  Lipase 01203, sodium 139, potassium 5 chloride 110, CO2 22, BUN 55, creatinine 1.79, glucose 129, total bilirubin 1.5, AST 1872, , white blood cells 8.2, hemoglobin 10.8, hematocrit 32.9, platelets 304     September  30:  CT abdomen and pelvis showed intra and extrahepatic ductal dilatation.  Prior cholecystectomy.  Small fat containing umbilical hernia.  Diverticulosis no diverticulitis.     EKG showed an atrial paced rhythm with nonspecific T-wave abnormality and ventricular rate 70.     During my interview upon arrival to [facility], patient is awake, conversant and not in distress. Son is present at bedside.          Past Medical History:   Diagnosis Date    Allergic sinusitis     Anemia     Arm wound 04/18/2018    UPPER RIGHT ARM, SLIGHT REDNESS NO DRAINAGE    Blind right eye     retinal detachments, 5 surgeries Dr. Mamadou HURD    Cataract     LEFT EYE    CKD (chronic kidney disease) stage 3, GFR 30-59 ml/min     Sees Mora    Control of atrial fibrillation with pacemaker 10/21/2015    COPD (chronic obstructive pulmonary disease)     quit 2015    Degenerative joint disease (DJD) of lumbar spine     bulging discs L3-4    Essential hypertension 10/21/2015    Former smoker     50 Pk/yr; stopped 3/2015    GERD (gastroesophageal reflux disease)     Gross hematuria 12/31/2019    Hematuria 03/2021    High cholesterol     Hyperglycemia 04/2020    Hypertension         Pacemaker     Paroxysmal A-fib     PVD (peripheral vascular disease)     iliac stents    Rectocele     SVT (supraventricular tachycardia)     DR APARICIO    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 Samaniego and referred to [facility]    UTI (urinary tract infection)     Vaginal vault prolapse     Vitamin D deficiency        Past Surgical History:   Procedure Laterality Date    ABDOMINAL SACROCOLPOPEXY  2014    ANTERIOR VAGINAL REPAIR      APPENDECTOMY      BLADDER FULGURATION  1/2/2020    Procedure: FULGURATION, BLADDER;  Surgeon: Jorje Samaniego MD;  Location: [facility] OR;  Service: Urology;;    BLADDER POLYP      CARDIAC PACEMAKER PLACEMENT  7/2015    CARPAL TUNNEL RELEASE Right 2012    Vj    CATARACT EXTRACTION Right      COLONOSCOPY      CYSTOSCOPY W/ RETROGRADES Bilateral 1/2/2020    Procedure: CYSTOSCOPY WITH RETROGRADE PYELOGRAM;  Surgeon: Jorje Samaniego MD;  Location: [facility] OR;  Service: Urology;  Laterality: Bilateral;    CYSTOSCOPY W/ RETROGRADES Bilateral 3/24/2021    Procedure: CYSTOSCOPY WITH RETROGRADE PYELOGRAM;  Surgeon: Jorje Samaniego MD;  Location: [facility] OR;  Service: Urology;  Laterality: Bilateral;    DIGITAL RECTAL EXAMINATION UNDER ANESTHESIA N/A 3/24/2021    Procedure: EXAM UNDER ANESTHESIA, DIGITAL, RECTUM;  Surgeon: Jorje Samaniego MD;  Location: [facility] OR;  Service: Urology;  Laterality: N/A;    DILATION OF URETHRA N/A 1/2/2020    Procedure: DILATION, URETHRA;  Surgeon: Jorje Samaniego MD;  Location: [facility] OR;  Service: Urology;  Laterality: N/A;    DILATION OF URETHRA N/A 3/24/2021    Procedure: DILATION, URETHRA;  Surgeon: Jorje Samaniego MD;  Location: [facility] OR;  Service: Urology;  Laterality: N/A;    TIEN      X 3    ESOPHAGOGASTRODUODENOSCOPY      FEMORAL STENTS      X 2    FIXATION KYPHOPLASTY N/A 10/25/2022    Procedure: KYPHOPLASTY T11;  Surgeon: Alli Bazan Jr., MD;  Location: [facility] OR;  Service: Orthopedics;  Laterality: N/A;  7am per Monica    HERNIA REPAIR      RIH, UMBILICAL    HYSTERECTOMY      LAPAROSCOPIC CHOLECYSTECTOMY  2010    E. Steve    OOPHORECTOMY      Ovarian cyst    RECTOCELE REPAIR      Aden    RETINAL DETACHMENT SURGERY Right     Mamadou 5 surgeries    TOTAL ABDOMINAL HYSTERECTOMY W/ BILATERAL SALPINGOOPHORECTOMY  1978    BSO/prolapse    URETHRAL SLING         Review of patient's allergies indicates:   Allergen Reactions    Doxycycline      pancreatitis    Levaquin [levofloxacin]      hives    Axid [nizatidine]      NOT SURE OF REACTION TO MED    Cyclobenzaprine      NOT SURE OF REACTION    Liquibid d-r [phenylephrine-guaifenesin]      NOT SURE REACTION    Macrodantin [nitrofurantoin macrocrystalline] Other (See Comments)     TERRIBLE BACK ACHE  "   Prednisone Other (See Comments)     SEVERE PAIN IN 'STOMACH"  AND BLOATED    Adhesive      USE PAPER TAPE       Current Facility-Administered Medications on File Prior to Encounter   Medication    [DISCONTINUED] 0.9%  NaCl infusion    [DISCONTINUED] 0.9%  NaCl infusion    [DISCONTINUED] dofetilide capsule 250 mcg    [DISCONTINUED] hydrALAZINE injection 10 mg    [DISCONTINUED] isosorbide mononitrate 24 hr tablet 60 mg    [DISCONTINUED] lactated ringers infusion    [DISCONTINUED] LORazepam tablet 0.5 mg    [DISCONTINUED] melatonin tablet 6 mg    [DISCONTINUED] metoprolol succinate (TOPROL-XL) 24 hr tablet 50 mg    [DISCONTINUED] morphine injection 2 mg    [DISCONTINUED] NIFEdipine 24 hr tablet 30 mg    [DISCONTINUED] ondansetron injection 4 mg    [DISCONTINUED] pantoprazole injection 40 mg    [DISCONTINUED] piperacillin-tazobactam 3.375 g in dextrose 5 % 100 mL IVPB (ready to mix)    [DISCONTINUED] promethazine (PHENERGAN) 6.25 mg in dextrose 5 % (D5W) 50 mL IVPB    [DISCONTINUED] sertraline tablet 50 mg    [DISCONTINUED] sodium chloride 0.9% flush 10 mL     Current Outpatient Medications on File Prior to Encounter   Medication Sig    dofetilide (TIKOSYN) 250 MCG Cap Take 250 mcg by mouth every 12 (twelve) hours.     famotidine (PEPCID) 10 MG tablet Take 10 mg by mouth 2 (two) times daily as needed for Heartburn.    isosorbide mononitrate (IMDUR) 60 MG 24 hr tablet Take 1 tablet (60 mg total) by mouth every evening. Stop apresoline    LORazepam (ATIVAN) 0.5 MG tablet Take 1 tablet (0.5 mg total) by mouth every 6 (six) hours as needed for Anxiety. (Patient taking differently: Take 0.5 mg by mouth 2 (two) times daily.)    metoprolol succinate (TOPROL-XL) 50 MG 24 hr tablet Take 50 mg by mouth 2 (two) times daily.     NIFEdipine (ADALAT CC) 30 MG TbSR Take 30 mg by mouth every morning.    sertraline (ZOLOFT) 50 MG tablet Take 50 mg by mouth.    traMADoL (ULTRAM) 50 mg tablet Take 50 mg by " mouth every 8 (eight) hours as needed for Pain.     Family History       Problem Relation (Age of Onset)    Alcohol abuse Brother    Deep vein thrombosis Mother    Dementia Brother, Sister    Heart disease Mother    Prostate cancer Father          Tobacco Use    Smoking status: Former     Current packs/day: 0.00     Average packs/day: 0.8 packs/day for 50.0 years (37.5 ttl pk-yrs)     Types: Cigarettes     Start date: 3/19/1965     Quit date: 3/19/2015     Years since quittin.5    Smokeless tobacco: Never   Substance and Sexual Activity    Alcohol use: Not Currently    Drug use: No    Sexual activity: Not Currently     Partners: Male     Birth control/protection: Surgical     Review of Systems   Constitutional:  Positive for fatigue. Negative for activity change, appetite change, chills, diaphoresis and fever.   HENT:  Negative for congestion, dental problem, drooling, ear discharge, ear pain, facial swelling, hearing loss, mouth sores, nosebleeds, postnasal drip, rhinorrhea, sinus pressure, sneezing, sore throat, tinnitus, trouble swallowing and voice change.    Eyes:  Negative for photophobia, pain, discharge, redness, itching and visual disturbance.   Respiratory:  Negative for apnea, cough, choking, chest tightness, shortness of breath, wheezing and stridor.    Cardiovascular:  Negative for chest pain, palpitations and leg swelling.   Gastrointestinal:  Positive for abdominal pain, nausea and vomiting. Negative for abdominal distention, anal bleeding, blood in stool, constipation, diarrhea and rectal pain.   Endocrine: Negative for cold intolerance, heat intolerance, polydipsia, polyphagia and polyuria.   Genitourinary:  Negative for decreased urine volume, difficulty urinating, dyspareunia, dysuria, enuresis, flank pain, frequency, genital sores, hematuria, menstrual problem, pelvic pain, urgency, vaginal bleeding, vaginal discharge and vaginal pain.   Musculoskeletal:  Negative for arthralgias, back  pain, gait problem, joint swelling, myalgias, neck pain and neck stiffness.   Skin:  Negative for color change, pallor, rash and wound.   Allergic/Immunologic: Negative for environmental allergies, food allergies and immunocompromised state.   Neurological:  Negative for dizziness, tremors, seizures, syncope, facial asymmetry, speech difficulty, weakness, light-headedness, numbness and headaches.   Hematological:  Negative for adenopathy. Does not bruise/bleed easily.   Psychiatric/Behavioral:  Negative for agitation, behavioral problems, confusion, decreased concentration, dysphoric mood, hallucinations, self-injury, sleep disturbance and suicidal ideas. The patient is not nervous/anxious and is not hyperactive.      Objective:     Vital Signs (Most Recent):  Temp: 98.2 °F (36.8 °C) (10/03/23 0509)  Pulse: 69 (10/03/23 0509)  Resp: 18 (10/03/23 0509)  BP: (!) 171/72 (10/03/23 0509)  SpO2: 95 % (10/03/23 0509) Vital Signs (24h Range):  Temp:  [96 °F (35.6 °C)-98.7 °F (37.1 °C)] 98.2 °F (36.8 °C)  Pulse:  [69-70] 69  Resp:  [18-20] 18  SpO2:  [94 %-99 %] 95 %  BP: (142-183)/(65-88) 171/72     Weight: 53.8 kg (118 lb 9.7 oz)  Body mass index is 20.36 kg/m².     Physical Exam  Constitutional:       General: She is not in acute distress.     Appearance: She is well-developed. She is not diaphoretic.   HENT:      Head: Normocephalic and atraumatic.      Right Ear: External ear normal.      Left Ear: External ear normal.      Nose: Nose normal.      Mouth/Throat:      Pharynx: No oropharyngeal exudate.   Eyes:      General: No scleral icterus.     Conjunctiva/sclera: Conjunctivae normal.      Pupils: Pupils are equal, round, and reactive to light.   Neck:      Thyroid: No thyromegaly.      Vascular: No JVD.      Trachea: No tracheal deviation.   Cardiovascular:      Rate and Rhythm: Normal rate and regular rhythm.      Heart sounds: Normal heart sounds. No murmur heard.     No gallop.   Pulmonary:      Effort: Pulmonary  effort is normal. No respiratory distress.      Breath sounds: Normal breath sounds. No wheezing or rales.   Chest:      Chest wall: No tenderness.   Abdominal:      General: Bowel sounds are normal. There is no distension.      Palpations: Abdomen is soft. There is no mass.      Tenderness: There is abdominal tenderness (mild epigastric TTP). There is no guarding or rebound.   Genitourinary:     Vagina: No vaginal discharge.   Musculoskeletal:         General: No tenderness.      Cervical back: Neck supple.   Lymphadenopathy:      Cervical: No cervical adenopathy.   Skin:     General: Skin is warm and dry.      Coloration: Skin is not pale.      Findings: No erythema or rash.   Neurological:      Mental Status: She is alert and oriented to person, place, and time.      Cranial Nerves: No cranial nerve deficit.      Motor: No abnormal muscle tone.      Coordination: Coordination normal.      Deep Tendon Reflexes: Reflexes are normal and symmetric. Reflexes normal.      Comments: Mild tremors of upper extremities    Psychiatric:         Behavior: Behavior normal.         Thought Content: Thought content normal.         Judgment: Judgment normal.      Comments: Flat affect               CRANIAL NERVES     CN III, IV, VI   Pupils are equal, round, and reactive to light.       Significant Labs: All pertinent labs within the past 24 hours have been reviewed.  Recent Lab Results         10/03/23  0536        Albumin 3.3       Alkaline Phosphatase 173              Anion Gap 14              Baso # 0.07       Basophil % 0.7       BILIRUBIN TOTAL 0.7  Comment: For infants and newborns, interpretation of results should be based  on gestational age, weight and in agreement with clinical  observations.    Premature Infant recommended reference ranges:  Up to 24 hours.............<8.0 mg/dL  Up to 48 hours............<12.0 mg/dL  3-5 days..................<15.0 mg/dL  6-29 days.................<15.0 mg/dL          BUN 31       Calcium 9.5       Chloride 111       CO2 15       Creatinine 2.2       Differential Method Automated       eGFR 21.7       Eos # 0.3       Eosinophil % 2.5       Glucose 99       Gran # (ANC) 7.9       Gran % 77.8       Hematocrit 32.6       Hemoglobin 10.8       Immature Grans (Abs) 0.06  Comment: Mild elevation in immature granulocytes is non specific and   can be seen in a variety of conditions including stress response,   acute inflammation, trauma and pregnancy. Correlation with other   laboratory and clinical findings is essential.         Immature Granulocytes 0.6       Lipase 164       Lymph # 1.2       Lymph % 11.5       Magnesium  1.6       MCH 29.0       MCHC 33.1       MCV 88       Mono # 0.7       Mono % 6.9       MPV 8.7       nRBC 0       Phosphorus 2.8       Platelets 294       Potassium 3.6       PROTEIN TOTAL 7.1       RBC 3.72       RDW 13.6       Sodium 140       WBC 10.15               Significant Imaging: I have reviewed all pertinent imaging results/findings within the past 24 hours.    Assessment/Plan:     * Acute biliary pancreatitis  -presents as a transfer from [facility] for management of acute biliary pancreatitis  -elevated Lipase and abnormal LFTs   -lipase and LFTs down trending    -CT showed intra and extrahepatic biliary dilation   -abdominal pain controlled with prn morphine  -continue empiric zosyn    -npo, mIVF pending AES evaluation for EUS/ERCP   -consult AES      Essential hypertension  -chronic and controlled   -continue home isosorbide, nifedipine and metoprolol       Control of atrial fibrillation with pacemaker  Patient with Paroxysmal (<7 days) atrial fibrillation which is controlled currently with Beta Blocker and dofetilide . Patient is currently in sinus rhythm.[id number] Score: 3. . Anticoagulation not indicated due to h/o bleeding.    CKD (chronic kidney disease), stage IV  -Cr 2.2 which is at her baseline       Anxiety with  depression  Patient has recurrent depression which is moderate and is currently controlled. Will Continue anti-depressant medications. We will not consult psychiatry at this time. Patient does not display psychosis at this time. Continue to monitor closely and adjust plan of care as needed.        VTE Risk Mitigation (From admission, onward)         Ordered     Place NORMA hose  Until discontinued         10/03/23 0040                           Rosaura Becerra DO  Department of Hospital Medicine  [facility] - Intensive Care ([place])

## 2023-10-03 NOTE — SUBJECTIVE & OBJECTIVE
Past Medical History:   Diagnosis Date    Allergic sinusitis     Anemia     Arm wound 04/18/2018    UPPER RIGHT ARM, SLIGHT REDNESS NO DRAINAGE    Blind right eye     retinal detachments, 5 surgeries Dr. Mamadou HURD    Cataract     LEFT EYE    CKD (chronic kidney disease) stage 3, GFR 30-59 ml/min     Sees Mora    Control of atrial fibrillation with pacemaker 10/21/2015    COPD (chronic obstructive pulmonary disease)     quit 2015    Degenerative joint disease (DJD) of lumbar spine     bulging discs L3-4    Essential hypertension 10/21/2015    Former smoker     50 Pk/yr; stopped 3/2015    GERD (gastroesophageal reflux disease)     Gross hematuria 12/31/2019    Hematuria 03/2021    High cholesterol     Hyperglycemia 04/2020    Hypertension         Pacemaker     Paroxysmal A-fib     PVD (peripheral vascular disease)     iliac stents    Rectocele     SVT (supraventricular tachycardia)     DR APARICIO    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 Samaniego and referred to [facility]    UTI (urinary tract infection)     Vaginal vault prolapse     Vitamin D deficiency        Past Surgical History:   Procedure Laterality Date    ABDOMINAL SACROCOLPOPEXY  2014    ANTERIOR VAGINAL REPAIR      APPENDECTOMY      BLADDER FULGURATION  1/2/2020    Procedure: FULGURATION, BLADDER;  Surgeon: Jorje Samaniego MD;  Location: [facility] OR;  Service: Urology;;    BLADDER POLYP      CARDIAC PACEMAKER PLACEMENT  7/2015    CARPAL TUNNEL RELEASE Right 2012    Vj    CATARACT EXTRACTION Right     COLONOSCOPY      CYSTOSCOPY W/ RETROGRADES Bilateral 1/2/2020    Procedure: CYSTOSCOPY WITH RETROGRADE PYELOGRAM;  Surgeon: Jorje Samaniego MD;  Location: [facility] OR;  Service: Urology;  Laterality: Bilateral;    CYSTOSCOPY W/ RETROGRADES Bilateral 3/24/2021    Procedure: CYSTOSCOPY WITH RETROGRADE PYELOGRAM;  Surgeon: Jorje Samaniego MD;  Location: [facility] OR;  Service: Urology;  Laterality: Bilateral;    DIGITAL RECTAL EXAMINATION UNDER  "ANESTHESIA N/A 3/24/2021    Procedure: EXAM UNDER ANESTHESIA, DIGITAL, RECTUM;  Surgeon: Jorje Samaniego MD;  Location: [facility] OR;  Service: Urology;  Laterality: N/A;    DILATION OF URETHRA N/A 1/2/2020    Procedure: DILATION, URETHRA;  Surgeon: Jorje Samaniego MD;  Location: [facility] OR;  Service: Urology;  Laterality: N/A;    DILATION OF URETHRA N/A 3/24/2021    Procedure: DILATION, URETHRA;  Surgeon: Jorje Samaniego MD;  Location: [facility] OR;  Service: Urology;  Laterality: N/A;    TIEN      X 3    ESOPHAGOGASTRODUODENOSCOPY      FEMORAL STENTS      X 2    FIXATION KYPHOPLASTY N/A 10/25/2022    Procedure: KYPHOPLASTY T11;  Surgeon: Alli Bazan Jr., MD;  Location: [facility] OR;  Service: Orthopedics;  Laterality: N/A;  7am per Monica    HERNIA REPAIR      RIH, UMBILICAL    HYSTERECTOMY      LAPAROSCOPIC CHOLECYSTECTOMY  2010    E. Steve    OOPHORECTOMY      Ovarian cyst    RECTOCELE REPAIR      Aden    RETINAL DETACHMENT SURGERY Right     Mamadou 5 surgeries    TOTAL ABDOMINAL HYSTERECTOMY W/ BILATERAL SALPINGOOPHORECTOMY  1978    BSO/prolapse    URETHRAL SLING         Review of patient's allergies indicates:   Allergen Reactions    Doxycycline      pancreatitis    Levaquin [levofloxacin]      hives    Axid [nizatidine]      NOT SURE OF REACTION TO MED    Cyclobenzaprine      NOT SURE OF REACTION    Liquibid d-r [phenylephrine-guaifenesin]      NOT SURE REACTION    Macrodantin [nitrofurantoin macrocrystalline] Other (See Comments)     TERRIBLE BACK ACHE    Prednisone Other (See Comments)     SEVERE PAIN IN 'STOMACH"  AND BLOATED    Adhesive      USE PAPER TAPE       Current Facility-Administered Medications on File Prior to Encounter   Medication    [DISCONTINUED] 0.9%  NaCl infusion    [DISCONTINUED] 0.9%  NaCl infusion    [DISCONTINUED] dofetilide capsule 250 mcg    [DISCONTINUED] hydrALAZINE injection 10 mg    [DISCONTINUED] isosorbide mononitrate 24 hr tablet 60 mg    [DISCONTINUED] lactated ringers " infusion    [DISCONTINUED] LORazepam tablet 0.5 mg    [DISCONTINUED] melatonin tablet 6 mg    [DISCONTINUED] metoprolol succinate (TOPROL-XL) 24 hr tablet 50 mg    [DISCONTINUED] morphine injection 2 mg    [DISCONTINUED] NIFEdipine 24 hr tablet 30 mg    [DISCONTINUED] ondansetron injection 4 mg    [DISCONTINUED] pantoprazole injection 40 mg    [DISCONTINUED] piperacillin-tazobactam 3.375 g in dextrose 5 % 100 mL IVPB (ready to mix)    [DISCONTINUED] promethazine (PHENERGAN) 6.25 mg in dextrose 5 % (D5W) 50 mL IVPB    [DISCONTINUED] sertraline tablet 50 mg    [DISCONTINUED] sodium chloride 0.9% flush 10 mL     Current Outpatient Medications on File Prior to Encounter   Medication Sig    dofetilide (TIKOSYN) 250 MCG Cap Take 250 mcg by mouth every 12 (twelve) hours.     famotidine (PEPCID) 10 MG tablet Take 10 mg by mouth 2 (two) times daily as needed for Heartburn.    isosorbide mononitrate (IMDUR) 60 MG 24 hr tablet Take 1 tablet (60 mg total) by mouth every evening. Stop apresoline    LORazepam (ATIVAN) 0.5 MG tablet Take 1 tablet (0.5 mg total) by mouth every 6 (six) hours as needed for Anxiety. (Patient taking differently: Take 0.5 mg by mouth 2 (two) times daily.)    metoprolol succinate (TOPROL-XL) 50 MG 24 hr tablet Take 50 mg by mouth 2 (two) times daily.     NIFEdipine (ADALAT CC) 30 MG TbSR Take 30 mg by mouth every morning.    sertraline (ZOLOFT) 50 MG tablet Take 50 mg by mouth.    traMADoL (ULTRAM) 50 mg tablet Take 50 mg by mouth every 8 (eight) hours as needed for Pain.     Family History       Problem Relation (Age of Onset)    Alcohol abuse Brother    Deep vein thrombosis Mother    Dementia Brother, Sister    Heart disease Mother    Prostate cancer Father          Tobacco Use    Smoking status: Former     Current packs/day: 0.00     Average packs/day: 0.8 packs/day for 50.0 years (37.5 ttl pk-yrs)     Types: Cigarettes     Start date: 3/19/1965     Quit date: 3/19/2015     Years since quittin.5     Smokeless tobacco: Never   Substance and Sexual Activity    Alcohol use: Not Currently    Drug use: No    Sexual activity: Not Currently     Partners: Male     Birth control/protection: Surgical     Review of Systems   Constitutional:  Positive for fatigue. Negative for activity change, appetite change, chills, diaphoresis and fever.   HENT:  Negative for congestion, dental problem, drooling, ear discharge, ear pain, facial swelling, hearing loss, mouth sores, nosebleeds, postnasal drip, rhinorrhea, sinus pressure, sneezing, sore throat, tinnitus, trouble swallowing and voice change.    Eyes:  Negative for photophobia, pain, discharge, redness, itching and visual disturbance.   Respiratory:  Negative for apnea, cough, choking, chest tightness, shortness of breath, wheezing and stridor.    Cardiovascular:  Negative for chest pain, palpitations and leg swelling.   Gastrointestinal:  Positive for abdominal pain, nausea and vomiting. Negative for abdominal distention, anal bleeding, blood in stool, constipation, diarrhea and rectal pain.   Endocrine: Negative for cold intolerance, heat intolerance, polydipsia, polyphagia and polyuria.   Genitourinary:  Negative for decreased urine volume, difficulty urinating, dyspareunia, dysuria, enuresis, flank pain, frequency, genital sores, hematuria, menstrual problem, pelvic pain, urgency, vaginal bleeding, vaginal discharge and vaginal pain.   Musculoskeletal:  Negative for arthralgias, back pain, gait problem, joint swelling, myalgias, neck pain and neck stiffness.   Skin:  Negative for color change, pallor, rash and wound.   Allergic/Immunologic: Negative for environmental allergies, food allergies and immunocompromised state.   Neurological:  Negative for dizziness, tremors, seizures, syncope, facial asymmetry, speech difficulty, weakness, light-headedness, numbness and headaches.   Hematological:  Negative for adenopathy. Does not bruise/bleed easily.    Psychiatric/Behavioral:  Negative for agitation, behavioral problems, confusion, decreased concentration, dysphoric mood, hallucinations, self-injury, sleep disturbance and suicidal ideas. The patient is not nervous/anxious and is not hyperactive.      Objective:     Vital Signs (Most Recent):  Temp: 98.2 °F (36.8 °C) (10/03/23 0509)  Pulse: 69 (10/03/23 0509)  Resp: 18 (10/03/23 0509)  BP: (!) 171/72 (10/03/23 0509)  SpO2: 95 % (10/03/23 0509) Vital Signs (24h Range):  Temp:  [96 °F (35.6 °C)-98.7 °F (37.1 °C)] 98.2 °F (36.8 °C)  Pulse:  [69-70] 69  Resp:  [18-20] 18  SpO2:  [94 %-99 %] 95 %  BP: (142-183)/(65-88) 171/72     Weight: 53.8 kg (118 lb 9.7 oz)  Body mass index is 20.36 kg/m².     Physical Exam  Constitutional:       General: She is not in acute distress.     Appearance: She is well-developed. She is not diaphoretic.   HENT:      Head: Normocephalic and atraumatic.      Right Ear: External ear normal.      Left Ear: External ear normal.      Nose: Nose normal.      Mouth/Throat:      Pharynx: No oropharyngeal exudate.   Eyes:      General: No scleral icterus.     Conjunctiva/sclera: Conjunctivae normal.      Pupils: Pupils are equal, round, and reactive to light.   Neck:      Thyroid: No thyromegaly.      Vascular: No JVD.      Trachea: No tracheal deviation.   Cardiovascular:      Rate and Rhythm: Normal rate and regular rhythm.      Heart sounds: Normal heart sounds. No murmur heard.     No gallop.   Pulmonary:      Effort: Pulmonary effort is normal. No respiratory distress.      Breath sounds: Normal breath sounds. No wheezing or rales.   Chest:      Chest wall: No tenderness.   Abdominal:      General: Bowel sounds are normal. There is no distension.      Palpations: Abdomen is soft. There is no mass.      Tenderness: There is abdominal tenderness (mild epigastric TTP). There is no guarding or rebound.   Genitourinary:     Vagina: No vaginal discharge.   Musculoskeletal:         General: No  tenderness.      Cervical back: Neck supple.   Lymphadenopathy:      Cervical: No cervical adenopathy.   Skin:     General: Skin is warm and dry.      Coloration: Skin is not pale.      Findings: No erythema or rash.   Neurological:      Mental Status: She is alert and oriented to person, place, and time.      Cranial Nerves: No cranial nerve deficit.      Motor: No abnormal muscle tone.      Coordination: Coordination normal.      Deep Tendon Reflexes: Reflexes are normal and symmetric. Reflexes normal.      Comments: Mild tremors of upper extremities    Psychiatric:         Behavior: Behavior normal.         Thought Content: Thought content normal.         Judgment: Judgment normal.      Comments: Flat affect               CRANIAL NERVES     CN III, IV, VI   Pupils are equal, round, and reactive to light.       Significant Labs: All pertinent labs within the past 24 hours have been reviewed.  Recent Lab Results         10/03/23  0536        Albumin 3.3       Alkaline Phosphatase 173              Anion Gap 14              Baso # 0.07       Basophil % 0.7       BILIRUBIN TOTAL 0.7  Comment: For infants and newborns, interpretation of results should be based  on gestational age, weight and in agreement with clinical  observations.    Premature Infant recommended reference ranges:  Up to 24 hours.............<8.0 mg/dL  Up to 48 hours............<12.0 mg/dL  3-5 days..................<15.0 mg/dL  6-29 days.................<15.0 mg/dL         BUN 31       Calcium 9.5       Chloride 111       CO2 15       Creatinine 2.2       Differential Method Automated       eGFR 21.7       Eos # 0.3       Eosinophil % 2.5       Glucose 99       Gran # (ANC) 7.9       Gran % 77.8       Hematocrit 32.6       Hemoglobin 10.8       Immature Grans (Abs) 0.06  Comment: Mild elevation in immature granulocytes is non specific and   can be seen in a variety of conditions including stress response,   acute inflammation, trauma  and pregnancy. Correlation with other   laboratory and clinical findings is essential.         Immature Granulocytes 0.6       Lipase 164       Lymph # 1.2       Lymph % 11.5       Magnesium  1.6       MCH 29.0       MCHC 33.1       MCV 88       Mono # 0.7       Mono % 6.9       MPV 8.7       nRBC 0       Phosphorus 2.8       Platelets 294       Potassium 3.6       PROTEIN TOTAL 7.1       RBC 3.72       RDW 13.6       Sodium 140       WBC 10.15               Significant Imaging: I have reviewed all pertinent imaging results/findings within the past 24 hours.

## 2023-10-03 NOTE — CONSULTS
[facility] - [facility] ([place] [address])  Gastroenterology  Consult Note    Patient Name: Ally Navarro  MRN: [id number]  Admission Date: 10/3/2023  Hospital Length of Stay: 0 days  Code Status: Full Code   Attending Provider: Erika Fine MD   Consulting Provider: Jelly Santiago MD  Primary Care Physician: Stevo Berry MD  Principal Problem:Acute biliary pancreatitis    Inpatient consult to [facility]  Consult performed by: Jelly Santiago MD  Consult ordered by: Rosaura Becerra DO        Subjective:     HPI:  Ally Navarro is an 82 year old female for whom [facility] is consulted for biliary pancreatitis. She has a medical history of chronic kidney disease, COPD, atrial fibrillation/pacemaker placement, prior cholecystectomy, bladder cancer. History provided by patient and chart review.     Pt initially presented to [facility] on 9/30 with epigastric abdominal pain associated with nausea and decreased appetite. Laboratory workup showed a leukocytosis (13), hyperbilirubinemia (1.5), elevated liver enzymes (AST 1872, ) and elevated lipase 88699, otherwise stable H&H. CTAP from 9/30 showed intra and extrahepatic ductal dilation. She was started on Zosyn for inatrabdominal coverage. She was unable to undergo MRCP but her pacemaker is not MRI compatible. She was ultimately transferred to [facility] for EUS. On arrival, she was afebrile and HDS. Laboratory workup notable for downtrending liver enzymes (,  and ) with a normal T bili. She states the pain has overall improved since it started.         Past Medical History:   Diagnosis Date    Allergic sinusitis     Anemia     Arm wound 04/18/2018    UPPER RIGHT ARM, SLIGHT REDNESS NO DRAINAGE    Blind right eye     retinal detachments, 5 surgeries Dr. Mamadou HURD    Cataract     LEFT EYE    CKD (chronic kidney disease) stage 3, GFR 30-59 ml/min     Sees Mora    Control of atrial fibrillation with pacemaker  10/21/2015    COPD (chronic obstructive pulmonary disease)     quit 2015    Degenerative joint disease (DJD) of lumbar spine     bulging discs L3-4    Essential hypertension 10/21/2015    Former smoker     50 Pk/yr; stopped 3/2015    GERD (gastroesophageal reflux disease)     Gross hematuria 12/31/2019    Hematuria 03/2021    High cholesterol     Hyperglycemia 04/2020    Hypertension         Pacemaker     Paroxysmal A-fib     PVD (peripheral vascular disease)     iliac stents    Rectocele     SVT (supraventricular tachycardia)     DR APARICIO    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 Samaniego and referred to     UTI (urinary tract infection)     Vaginal vault prolapse     Vitamin D deficiency        Past Surgical History:   Procedure Laterality Date    ABDOMINAL SACROCOLPOPEXY  2014    ANTERIOR VAGINAL REPAIR      APPENDECTOMY      BLADDER FULGURATION  1/2/2020    Procedure: FULGURATION, BLADDER;  Surgeon: Jorje Samaniego MD;  Location: [facility] OR;  Service: Urology;;    BLADDER POLYP      CARDIAC PACEMAKER PLACEMENT  7/2015    CARPAL TUNNEL RELEASE Right 2012    Vj    CATARACT EXTRACTION Right     COLONOSCOPY      CYSTOSCOPY W/ RETROGRADES Bilateral 1/2/2020    Procedure: CYSTOSCOPY WITH RETROGRADE PYELOGRAM;  Surgeon: Jorje Samaniego MD;  Location: [facility] OR;  Service: Urology;  Laterality: Bilateral;    CYSTOSCOPY W/ RETROGRADES Bilateral 3/24/2021    Procedure: CYSTOSCOPY WITH RETROGRADE PYELOGRAM;  Surgeon: Jorje Samaniego MD;  Location: [facility] OR;  Service: Urology;  Laterality: Bilateral;    DIGITAL RECTAL EXAMINATION UNDER ANESTHESIA N/A 3/24/2021    Procedure: EXAM UNDER ANESTHESIA, DIGITAL, RECTUM;  Surgeon: Jorje Samaniego MD;  Location: [facility] OR;  Service: Urology;  Laterality: N/A;    DILATION OF URETHRA N/A 1/2/2020    Procedure: DILATION, URETHRA;  Surgeon: Jorje Samaniego MD;  Location: [facility] OR;  Service: Urology;  Laterality: N/A;     "DILATION OF URETHRA N/A 3/24/2021    Procedure: DILATION, URETHRA;  Surgeon: Jorje Samaniego MD;  [facility];  Service: Urology;  Laterality: N/A;    TIEN      X 3    ESOPHAGOGASTRODUODENOSCOPY      FEMORAL STENTS      X 2    FIXATION KYPHOPLASTY N/A 10/25/2022    Procedure: KYPHOPLASTY T11;  Surgeon: Alli Bazan Jr., MD;  [facility]: [facility];  Service: Orthopedics;  Laterality: N/A;  7am per Monica    HERNIA REPAIR      RIH, UMBILICAL    HYSTERECTOMY      LAPAROSCOPIC CHOLECYSTECTOMY      E. Steve    OOPHORECTOMY      Ovarian cyst    RECTOCELE REPAIR      Aden    RETINAL DETACHMENT SURGERY Right     Mamadou 5 surgeries    TOTAL ABDOMINAL HYSTERECTOMY W/ BILATERAL SALPINGOOPHORECTOMY      BSO/prolapse    URETHRAL SLING         Review of patient's allergies indicates:   Allergen Reactions    Doxycycline      pancreatitis    Levaquin [levofloxacin]      hives    Axid [nizatidine]      NOT SURE OF REACTION TO MED    Cyclobenzaprine      NOT SURE OF REACTION    Liquibid d-r [phenylephrine-guaifenesin]      NOT SURE REACTION    Macrodantin [nitrofurantoin macrocrystalline] Other (See Comments)     TERRIBLE BACK ACHE    Prednisone Other (See Comments)     SEVERE PAIN IN 'STOMACH"  AND BLOATED    Adhesive      USE PAPER TAPE     Family History       Problem Relation (Age of Onset)    Alcohol abuse Brother    Deep vein thrombosis Mother    Dementia Brother, Sister    Heart disease Mother    Prostate cancer Father          Tobacco Use    Smoking status: Former     Current packs/day: 0.00     Average packs/day: 0.8 packs/day for 50.0 years (37.5 ttl pk-yrs)     Types: Cigarettes     Start date: 3/19/1965     Quit date: 3/19/2015     Years since quittin.5    Smokeless tobacco: Never   Substance and Sexual Activity    Alcohol use: Not Currently    Drug use: No    Sexual activity: Not Currently     Partners: Male     Birth control/protection: Surgical     Review of Systems "   Constitutional:  Negative for chills and fever.   Gastrointestinal:  Positive for abdominal pain. Negative for nausea and vomiting.   Skin:  Negative for color change.     Objective:     Vital Signs (Most Recent):  Temp: 98.1 °F (36.7 °C) (10/03/23 0753)  Pulse: 70 (10/03/23 0753)  Resp: 18 (10/03/23 0753)  BP: (!) 175/76 (10/03/23 0753)  SpO2: 95 % (10/03/23 0753) Vital Signs (24h Range):  Temp:  [96 °F (35.6 °C)-98.4 °F (36.9 °C)] 98.1 °F (36.7 °C)  Pulse:  [69-70] 70  Resp:  [18-20] 18  SpO2:  [94 %-99 %] 95 %  BP: (162-183)/(72-88) 175/76     Weight: 53.8 kg (118 lb 9.7 oz) (10/03/23 0118)  Body mass index is 20.36 kg/m².    No intake or output data in the 24 hours ending 10/03/23 0837    Lines/Drains/Airways       Peripheral Intravenous Line  Duration                  Peripheral IV - Single Lumen 10/02/23 1521 20 G Left Antecubital <1 day                     Physical Exam  Vitals reviewed.   Constitutional:       General: She is not in acute distress.     Appearance: She is not ill-appearing.   Eyes:      General: No scleral icterus.     Extraocular Movements: Extraocular movements intact.   Cardiovascular:      Rate and Rhythm: Normal rate.   Pulmonary:      Effort: Pulmonary effort is normal. No respiratory distress.   Abdominal:      General: There is no distension.      Tenderness: There is abdominal tenderness (epigastrium). There is no guarding.   Skin:     Coloration: Skin is not jaundiced.   Neurological:      Mental Status: She is alert. Mental status is at baseline.          Significant Labs:  All pertinent lab results from the last 24 hours have been reviewed.    Significant Imaging:  Imaging results within the past 24 hours have been reviewed.    Assessment/Plan:     GI  * Acute biliary pancreatitis  82 year old female with COPD, atrial fibrillation/pacemaker placement, prior cholecystectomy, bladder cancer transferred from [facility] for [facility] evaluation of biliary pancreatitis. She was unable to undergo  MRCP to further evaluate ductal dilation due to pacemaker incompatibility. Liver enzymes downtrending and abdominal pain has improved. She is not on any blood thinners.     Recommendations:  - Keep NPO; plan for EUS +/- ERCP today.       Thank you for your consult. I will follow-up with patient. Please contact us if you have any additional questions.    Jelly Santiago MD  Gastroenterology  [facility] - Intensive Care ([place]

## 2023-10-03 NOTE — ANESTHESIA PREPROCEDURE EVALUATION
"                                                                                                             10/03/2023  Ally Navarro is a 82 y.o., female   Pre-operative evaluation for Procedure(s) (LRB):  ERCP (ENDOSCOPIC RETROGRADE CHOLANGIOPANCREATOGRAPHY) (N/A)  ULTRASOUND, UPPER GI TRACT, ENDOSCOPIC (N/A)    Ally Navarro is a 82 y.o. female     Patient Active Problem List   Diagnosis    Rectocele    Essential hypertension    Stopped smoking with greater than 40 pack year history    Control of atrial fibrillation with pacemaker    Generalized osteoarthrosis, involving multiple sites    COPD (chronic obstructive pulmonary disease)    High cholesterol    GERD (gastroesophageal reflux disease)    PVD (peripheral vascular disease)    Paroxysmal A-fib    Allergic sinusitis    Anemia    Degenerative joint disease (DJD) of lumbar spine    Gross hematuria    Urothelial carcinoma of bladder    Hyperglycemia    Vitamin D deficiency     unwell    Compression fracture of thoracic vertebra    Pain of upper abdomen    CKD (chronic kidney disease), stage IV    Abnormal findings on imaging of biliary tract    Abnormal liver enzymes    Acute biliary pancreatitis    Anxiety with depression       Review of patient's allergies indicates:   Allergen Reactions    Doxycycline      pancreatitis    Levaquin [levofloxacin]      hives    Axid [nizatidine]      NOT SURE OF REACTION TO MED    Cyclobenzaprine      NOT SURE OF REACTION    Liquibid d-r [phenylephrine-guaifenesin]      NOT SURE REACTION    Macrodantin [nitrofurantoin macrocrystalline] Other (See Comments)     TERRIBLE BACK ACHE    Prednisone Other (See Comments)     SEVERE PAIN IN 'STOMACH"  AND BLOATED    Adhesive      USE PAPER TAPE       No current facility-administered medications on file prior to encounter.     Current Outpatient Medications on File Prior to Encounter   Medication Sig Dispense Refill    dofetilide (TIKOSYN) 250 " MCG Cap Take 250 mcg by mouth every 12 (twelve) hours.       famotidine (PEPCID) 10 MG tablet Take 10 mg by mouth 2 (two) times daily as needed for Heartburn.      isosorbide mononitrate (IMDUR) 60 MG 24 hr tablet Take 1 tablet (60 mg total) by mouth every evening. Stop apresoline 30 tablet 11    LORazepam (ATIVAN) 0.5 MG tablet Take 1 tablet (0.5 mg total) by mouth every 6 (six) hours as needed for Anxiety. (Patient taking differently: Take 0.5 mg by mouth 2 (two) times daily.) 40 tablet 5    metoprolol succinate (TOPROL-XL) 50 MG 24 hr tablet Take 50 mg by mouth 2 (two) times daily.       NIFEdipine (ADALAT CC) 30 MG TbSR Take 30 mg by mouth every morning.      sertraline (ZOLOFT) 50 MG tablet Take 50 mg by mouth.      traMADoL (ULTRAM) 50 mg tablet Take 50 mg by mouth every 8 (eight) hours as needed for Pain.         Past Surgical History:   Procedure Laterality Date    ABDOMINAL SACROCOLPOPEXY  2014    ANTERIOR VAGINAL REPAIR      APPENDECTOMY      BLADDER FULGURATION  1/2/2020    Procedure: FULGURATION, BLADDER;  Surgeon: Jorje Samaniego MD;  Location: [facility] OR;  Service: Urology;;    BLADDER POLYP      CARDIAC PACEMAKER PLACEMENT  7/2015    CARPAL TUNNEL RELEASE Right 2012    Vj    CATARACT EXTRACTION Right     COLONOSCOPY      CYSTOSCOPY W/ RETROGRADES Bilateral 1/2/2020    Procedure: CYSTOSCOPY WITH RETROGRADE PYELOGRAM;  Surgeon: Jorje Samaniego MD;  Location: [facility] OR;  Service: Urology;  Laterality: Bilateral;    CYSTOSCOPY W/ RETROGRADES Bilateral 3/24/2021    Procedure: CYSTOSCOPY WITH RETROGRADE PYELOGRAM;  Surgeon: Jorje Samaniego MD;  Location: [facility] OR;  Service: Urology;  Laterality: Bilateral;    DIGITAL RECTAL EXAMINATION UNDER ANESTHESIA N/A 3/24/2021    Procedure: EXAM UNDER ANESTHESIA, DIGITAL, RECTUM;  Surgeon: Jorje Samaniego MD;  Location: [facility] OR;  Service: Urology;  Laterality: N/A;    DILATION OF URETHRA N/A 1/2/2020    Procedure: DILATION, URETHRA;   Surgeon: Jorje Samaniego MD;  Location: [facility] OR;  Service: Urology;  Laterality: N/A;    DILATION OF URETHRA N/A 3/24/2021    Procedure: DILATION, URETHRA;  Surgeon: Jorje Samaniego MD;  Location: [facility] OR;  Service: Urology;  Laterality: N/A;    TIEN      X 3    ESOPHAGOGASTRODUODENOSCOPY      FEMORAL STENTS      X 2    FIXATION KYPHOPLASTY N/A 10/25/2022    Procedure: KYPHOPLASTY T11;  Surgeon: Alli Bazan Jr., MD;  Location: [facility] OR;  Service: Orthopedics;  Laterality: N/A;  7am per Monica    HERNIA REPAIR      RIH, UMBILICAL    HYSTERECTOMY      LAPAROSCOPIC CHOLECYSTECTOMY  2010    E. Steve    OOPHORECTOMY      Ovarian cyst    RECTOCELE REPAIR      Aden    RETINAL DETACHMENT SURGERY Right     Mamadou 5 surgeries    TOTAL ABDOMINAL HYSTERECTOMY W/ BILATERAL SALPINGOOPHORECTOMY  1978    BSO/prolapse    URETHRAL SLING         CBC:   Recent Labs     10/02/23  0459 10/03/23  0536   WBC 5.50 10.15   RBC 3.29* 3.72*   HGB 9.6* 10.8*   HCT 29.4* 32.6*    294   MCV 89 88   MCH 29.3 29.0   MCHC 32.8 33.1       CMP:   Recent Labs     09/30/23  1652 10/01/23  0555 10/02/23  0459 10/03/23  0536      < > 139 140   K 4.5   < > 4.1 3.6      < > 114* 111*   CO2 21*   < > 17* 15*   BUN 67*   < > 45* 31*   CREATININE 2.09*   < > 2.13* 2.2*   *   < > 101 99   MG 2.2  --   --  1.6   PHOS  --   --   --  2.8   CALCIUM 10.2   < > 8.8 9.5   ALBUMIN 4.7   < > 3.3* 3.3*   PROT 8.5*   < > 6.2* 7.1   ALKPHOS 100   < > 178* 173*   ALT 51*   < > 562* 379*   *   < > 532* 199*   BILITOT 0.8   < > 1.0 0.7    < > = values in this interval not displayed.       Pre-op Assessment    I have reviewed the Patient Summary Reports.     I have reviewed the Nursing Notes. I have reviewed the NPO Status.   I have reviewed the Medications.     Review of Systems  Anesthesia Hx:  No problems with previous Anesthesia  History of prior surgery of interest to airway management or planning: Denies Family  Hx of Anesthesia complications.   Denies Personal Hx of Anesthesia complications.   Social:  No Alcohol Use, Non-Smoker, Former Smoker    Hematology/Oncology:  Hematology Normal   Oncology Normal     EENT/Dental:EENT/Dental Normal   Cardiovascular:   Exercise tolerance: good Hypertension    Pulmonary:   COPD    Renal/:   Chronic Renal Disease, CKD    Hepatic/GI:   GERD    Musculoskeletal:   Arthritis     Neurological:  Neurology Normal    Endocrine:  Endocrine Normal    Psych:   anxiety depression          Physical Exam  General: Well nourished, Cooperative, Alert and Oriented    Airway:  Mallampati: III   Mouth Opening: Normal  TM Distance: Normal  Tongue: Normal  Neck ROM: Normal ROM    Dental:  Intact    Chest/Lungs:  Clear to auscultation, Normal Respiratory Rate    Heart:  Rate: Normal        Anesthesia Plan  Type of Anesthesia, risks & benefits discussed:    Anesthesia Type: Gen ETT, Gen Natural Airway  Intra-op Monitoring Plan: Standard ASA Monitors  Post Op Pain Control Plan: multimodal analgesia and IV/PO Opioids PRN  Induction:  IV  Airway Plan: Direct  Informed Consent: Informed consent signed with the Patient and all parties understand the risks and agree with anesthesia plan.  All questions answered. Patient consented to blood products? No  ASA Score: 3  Day of Surgery Review of History & Physical: H&P Update referred to the surgeon/provider.    Ready For Surgery From Anesthesia Perspective.     .

## 2023-10-04 VITALS
OXYGEN SATURATION: 98 % | BODY MASS INDEX: 20.25 KG/M2 | DIASTOLIC BLOOD PRESSURE: 78 MMHG | HEIGHT: 64 IN | RESPIRATION RATE: 17 BRPM | WEIGHT: 118.63 LBS | TEMPERATURE: 98 F | HEART RATE: 69 BPM | SYSTOLIC BLOOD PRESSURE: 176 MMHG

## 2023-10-04 PROBLEM — R05.9 COUGH IN ADULT PATIENT: Status: ACTIVE | Noted: 2023-10-03

## 2023-10-04 LAB
ALBUMIN SERPL BCP-MCNC: 2.9 G/DL (ref 3.5–5.2)
ALP SERPL-CCNC: 124 U/L (ref 55–135)
ALT SERPL W/O P-5'-P-CCNC: 215 U/L (ref 10–44)
ANION GAP SERPL CALC-SCNC: 10 MMOL/L (ref 8–16)
AST SERPL-CCNC: 81 U/L (ref 10–40)
BASOPHILS # BLD AUTO: 0.04 K/UL (ref 0–0.2)
BASOPHILS NFR BLD: 0.6 % (ref 0–1.9)
BILIRUB SERPL-MCNC: 0.3 MG/DL (ref 0.1–1)
BUN SERPL-MCNC: 34 MG/DL (ref 8–23)
CALCIUM SERPL-MCNC: 8.5 MG/DL (ref 8.7–10.5)
CHLORIDE SERPL-SCNC: 110 MMOL/L (ref 95–110)
CO2 SERPL-SCNC: 20 MMOL/L (ref 23–29)
CREAT SERPL-MCNC: 2.1 MG/DL (ref 0.5–1.4)
DIFFERENTIAL METHOD: ABNORMAL
EOSINOPHIL # BLD AUTO: 0.3 K/UL (ref 0–0.5)
EOSINOPHIL NFR BLD: 3.6 % (ref 0–8)
ERYTHROCYTE [DISTWIDTH] IN BLOOD BY AUTOMATED COUNT: 13.5 % (ref 11.5–14.5)
EST. GFR  (NO RACE VARIABLE): 22.9 ML/MIN/1.73 M^2
GLUCOSE SERPL-MCNC: 94 MG/DL (ref 70–110)
HCT VFR BLD AUTO: 26.6 % (ref 37–48.5)
HGB BLD-MCNC: 8.7 G/DL (ref 12–16)
IMM GRANULOCYTES # BLD AUTO: 0.04 K/UL (ref 0–0.04)
IMM GRANULOCYTES NFR BLD AUTO: 0.6 % (ref 0–0.5)
LYMPHOCYTES # BLD AUTO: 1 K/UL (ref 1–4.8)
LYMPHOCYTES NFR BLD: 14.8 % (ref 18–48)
MAGNESIUM SERPL-MCNC: 1.7 MG/DL (ref 1.6–2.6)
MCH RBC QN AUTO: 29.4 PG (ref 27–31)
MCHC RBC AUTO-ENTMCNC: 32.7 G/DL (ref 32–36)
MCV RBC AUTO: 90 FL (ref 82–98)
MONOCYTES # BLD AUTO: 0.6 K/UL (ref 0.3–1)
MONOCYTES NFR BLD: 8 % (ref 4–15)
NEUTROPHILS # BLD AUTO: 5 K/UL (ref 1.8–7.7)
NEUTROPHILS NFR BLD: 72.4 % (ref 38–73)
NRBC BLD-RTO: 0 /100 WBC
PHOSPHATE SERPL-MCNC: 3.2 MG/DL (ref 2.7–4.5)
PLATELET # BLD AUTO: 235 K/UL (ref 150–450)
PMV BLD AUTO: 9 FL (ref 9.2–12.9)
POTASSIUM SERPL-SCNC: 3.4 MMOL/L (ref 3.5–5.1)
PROT SERPL-MCNC: 5.9 G/DL (ref 6–8.4)
RBC # BLD AUTO: 2.96 M/UL (ref 4–5.4)
SODIUM SERPL-SCNC: 140 MMOL/L (ref 136–145)
WBC # BLD AUTO: 6.91 K/UL (ref 3.9–12.7)

## 2023-10-04 PROCEDURE — 80053 COMPREHEN METABOLIC PANEL: CPT | Performed by: HOSPITALIST

## 2023-10-04 PROCEDURE — 85025 COMPLETE CBC W/AUTO DIFF WBC: CPT | Performed by: HOSPITALIST

## 2023-10-04 PROCEDURE — 83735 ASSAY OF MAGNESIUM: CPT | Performed by: HOSPITALIST

## 2023-10-04 PROCEDURE — 99239 HOSP IP/OBS DSCHRG MGMT >30: CPT | Mod: ,,, | Performed by: STUDENT IN AN ORGANIZED HEALTH CARE EDUCATION/TRAINING PROGRAM

## 2023-10-04 PROCEDURE — 84100 ASSAY OF PHOSPHORUS: CPT | Performed by: HOSPITALIST

## 2023-10-04 PROCEDURE — 99239 PR HOSPITAL DISCHARGE DAY,>30 MIN: ICD-10-PCS | Mod: ,,, | Performed by: STUDENT IN AN ORGANIZED HEALTH CARE EDUCATION/TRAINING PROGRAM

## 2023-10-04 PROCEDURE — 25000003 PHARM REV CODE 250: Performed by: HOSPITALIST

## 2023-10-04 PROCEDURE — C9113 INJ PANTOPRAZOLE SODIUM, VIA: HCPCS | Performed by: HOSPITALIST

## 2023-10-04 PROCEDURE — 36415 COLL VENOUS BLD VENIPUNCTURE: CPT | Performed by: HOSPITALIST

## 2023-10-04 PROCEDURE — 63600175 PHARM REV CODE 636 W HCPCS: Performed by: HOSPITALIST

## 2023-10-04 PROCEDURE — 25000003 PHARM REV CODE 250: Performed by: STUDENT IN AN ORGANIZED HEALTH CARE EDUCATION/TRAINING PROGRAM

## 2023-10-04 RX ORDER — BENZONATATE 100 MG/1
100 CAPSULE ORAL 3 TIMES DAILY PRN
Qty: 30 CAPSULE | Refills: 0 | Status: SHIPPED | OUTPATIENT
Start: 2023-10-04 | End: 2023-10-14

## 2023-10-04 RX ORDER — PANTOPRAZOLE SODIUM 40 MG/1
40 TABLET, DELAYED RELEASE ORAL DAILY
Status: CANCELLED | OUTPATIENT
Start: 2023-10-05

## 2023-10-04 RX ADMIN — DOFETILIDE 250 MCG: 250 CAPSULE ORAL at 12:10

## 2023-10-04 RX ADMIN — NIFEDIPINE 30 MG: 30 TABLET, FILM COATED, EXTENDED RELEASE ORAL at 09:10

## 2023-10-04 RX ADMIN — METOPROLOL SUCCINATE 50 MG: 50 TABLET, EXTENDED RELEASE ORAL at 09:10

## 2023-10-04 RX ADMIN — HYDRALAZINE HYDROCHLORIDE 10 MG: 20 INJECTION, SOLUTION INTRAMUSCULAR; INTRAVENOUS at 11:10

## 2023-10-04 RX ADMIN — PANTOPRAZOLE SODIUM 40 MG: 40 INJECTION, POWDER, FOR SOLUTION INTRAVENOUS at 09:10

## 2023-10-04 RX ADMIN — SERTRALINE HYDROCHLORIDE 50 MG: 25 TABLET ORAL at 09:10

## 2023-10-04 RX ADMIN — BENZONATATE 100 MG: 100 CAPSULE ORAL at 09:10

## 2023-10-04 NOTE — DISCHARGE SUMMARY
[facility] - Intensive Care ([place] [address])  [facility] Medicine  Discharge Summary      Patient Name: Ally Navarro  MRN: [id number]  THIAGO: [id number]  Patient Class: IP- Inpatient  Admission Date: 10/3/2023  Hospital Length of Stay: 1 days  Discharge Date and Time: 10/4/2023  1:25 PM  Attending Physician: Erika Fine MD   Discharging Provider: Erika Fine MD  Primary Care Provider: Stevo Berry MD  Hospital Medicine Team: [facility]  Erika Fine MD  Primary Care Team: [facility] W    HPI:   82-year-old female with a history of anemia, chronic kidney disease IV (baseline Cr 2.5 - 3), COPD, atrial fibrillation/pacemaker placement (on dofetilide, not on AC) , hypertension, hyperlipidemia,  bladder cancer on remission, remote cholecystectomy, MCI who admitted to [facility] on September 30 with upper abdominal pain, nausea, and poor appetite. No fever noted.  Lipase was elevated.  CT of the abdomen and pelvis showed intra/extrahepatic ductal dilatation.  From September 30 to October 1, creatinine decreased from 2.09 down to 1.79.  AST increased from 125 to 1872, and ALT increased from 51 up to 995.  Total bilirubin increased from 0.8 up to 1.5. She was seen by Cardiology who noted that her pacemaker is not MRI compatible. She was seen by Gastroenterology, and it was felt that she needed further endoscopic evaluation. Abdominal pain has persisted, and she is developing jaundice by report.  She remains on Zosyn.  Blood pressure has remained elevated during her stay, and medications are being adjusted along with the addition of p.r.n. IV antihypertensives.  With concern for biliary pancreatitis, will plan transfer to Hospital Medicine at [facility] in step-down status for Biliary Service evaluation.     October 1:  Lipase 67549, sodium 139, potassium 5 chloride 110, CO2 22, BUN 55, creatinine 1.79, glucose 129, total bilirubin 1.5, AST 1872, , white blood cells 8.2, hemoglobin 10.8,  hematocrit 32.9, platelets 304     September 30:  CT abdomen and pelvis showed intra and extrahepatic ductal dilatation.  Prior cholecystectomy.  Small fat containing umbilical hernia.  Diverticulosis no diverticulitis.     EKG showed an atrial paced rhythm with nonspecific T-wave abnormality and ventricular rate 70.     During my interview upon arrival to [facility], patient is awake, conversant and not in distress. Son is present at bedside.          Procedure(s) (LRB):  ERCP (ENDOSCOPIC RETROGRADE CHOLANGIOPANCREATOGRAPHY) (N/A)  ULTRASOUND, UPPER GI TRACT, ENDOSCOPIC (N/A)      Hospital Course:   Ms. Ally Navarro is a 81yo female with a history of anemia, chronic kidney disease IV (baseline Cr 2.5 - 3), COPD, atrial fibrillation/pacemaker placement (on dofetilide, not on AC) , hypertension, hyperlipidemia,  bladder cancer on remission, remote cholecystectomy, MCI who was admitted to [facility] on September 30 with upper abdominal pain, nausea, and poor appetite. She was found to have elevated lipase, elevated LFTs, elevated bilirubin and intra/extrahepatic ductal dilation on imaging. Patient's pacemaker is not MRI compatible. She was transferred to [facility] due to concern for biliary pancreatitis. She was taken for upper EUS on 10/3. No abnormalities or signs of pathology were found during the procedure. Patient's pain was well controlled w/o IV medications post-procedure. She is tolerating an normal diet and feels well enough for discharge on previous home medication. She plans to follow-up with her PCP within a couple weeks for hospital follow-up and blood pressure check.        Goals of Care Treatment Preferences:  Code Status: Full Code      Consults:   Consults (From admission, onward)          Status Ordering Provider     Inpatient consult to [facility]  Once        Provider:  (Not yet assigned)    Completed BERNARD ARAGON     Inpatient consult to Social Work/Case Management   Once        Provider:  (Not yet assigned)    Acknowledged BETTY ORONA            Psychiatric  Anxiety with depression  Patient has recurrent depression which is moderate and is currently controlled. Will Continue anti-depressant medications. We will not consult psychiatry at this time. Patient does not display psychosis at this time. Continue to monitor closely and adjust plan of care as needed.  - continue home medication         Pulmonary  Cough in adult patient  Patient complaining of mild cough  - discharged with tessalon perles      Cardiac/Vascular  Control of atrial fibrillation with pacemaker  Patient with Paroxysmal (<7 days) atrial fibrillation which is controlled currently with Beta Blocker and dofetilide . Patient is currently in sinus rhythm.[id number] Score: 3. . Anticoagulation not indicated due to h/o bleeding.    Essential hypertension  Cronic and controlled at home. Patient did require prn hydralazine while hospitalized.   - continue home isosorbide, nifedipine and metoprolol       Renal/  CKD (chronic kidney disease), stage IV  Cr 2.2 which is at her baseline       GI  * Acute biliary pancreatitis  Transferred from [facility] for management of acute biliary pancreatitis. Elevated Lipase and abnormal LFTs, lipase and LFTs down trending by 10/3. CT showed intra and extrahepatic biliary dilation. Abdominal pain controlled with prn morphine prior to procedure. Treated with empiric zosyn which was discontinued after procedure. Patient w/o evidence of infection.       Final Active Diagnoses:    Diagnosis Date Noted POA    PRINCIPAL PROBLEM:  Acute biliary pancreatitis [K85.10] 10/03/2023 Yes    Anxiety with depression [F41.8] 10/03/2023 Yes    Cough in adult patient [R05.9] 10/03/2023 Unknown    CKD (chronic kidney disease), stage IV [N18.4] 10/01/2023 Yes    Essential hypertension [I10] 10/21/2015 Yes    Control of atrial fibrillation with pacemaker [I48.91, Z95.0] 10/21/2015  Yes      Problems Resolved During this Admission:       Discharged Condition: good    Disposition: Home or Self Care    Follow Up:   Follow-up Information       Arminda Parsons MD Follow up.    Specialty: Family Medicine  Why: Next Steps: Follow up on 10/6/2023  Appointment:   Instructions: October 6th @ 10:00 Please arrive 15 minutes early.  Contact information:  [address]n Jan[address]aksh[address]  [place] [address]  [phone]                           Patient Instructions:      Ambulatory referral/consult to Internal Medicine   Standing Status: Future   Referral Priority: Routine Referral Type: Consultation   Referral Reason: Specialty Services Required   Requested Specialty: Internal Medicine   Number of Visits Requested: 1     Ambulatory referral/consult to [facility]   Standing Status: Future   Referral Priority: Routine Referral Type: Consultation   Referral Reason: Specialty Services Required   Number of Visits Requested: 1     Diet renal     Notify your health care provider if you experience any of the following:  temperature >100.4     Notify your health care provider if you experience any of the following:  persistent nausea and vomiting or diarrhea     Notify your health care provider if you experience any of the following:  severe uncontrolled pain     Notify your health care provider if you experience any of the following:  redness, tenderness, or signs of infection (pain, swelling, redness, odor or green/yellow discharge around incision site)     Notify your health care provider if you experience any of the following:  difficulty breathing or increased cough     Notify your health care provider if you experience any of the following:  severe persistent headache     Notify your health care provider if you experience any of the following:  worsening rash     Notify your health care provider if you experience any of the following:  persistent dizziness, light-headedness, or visual disturbances      Notify your health care provider if you experience any of the following:  increased confusion or weakness     Activity as tolerated       Significant Diagnostic Studies: Labs:   CMP   Recent Labs   Lab 10/03/23  0536 10/04/23  0511    140   K 3.6 3.4*   * 110   CO2 15* 20*   GLU 99 94   BUN 31* 34*   CREATININE 2.2* 2.1*   CALCIUM 9.5 8.5*   PROT 7.1 5.9*   ALBUMIN 3.3* 2.9*   BILITOT 0.7 0.3   ALKPHOS 173* 124   * 81*   * 215*   ANIONGAP 14 10    and Lipase 15,000 > 164  Endoscopy: upper EUS   Impression:            - Normal esophagus.                          - Normal stomach.                          - Normal examined duodenum.                          - There was no sign of significant pathology in                          the pancreatic head.                          - No specimens collected.     Pending Diagnostic Studies:       None           Medications:  Reconciled Home Medications:      Medication List        START taking these medications      benzonatate 100 MG capsule  Commonly known as: TESSALON  Take 1 capsule (100 mg total) by mouth 3 (three) times daily as needed for Cough.            CHANGE how you take these medications      LORazepam 0.5 MG tablet  Commonly known as: ATIVAN  Take 1 tablet (0.5 mg total) by mouth every 6 (six) hours as needed for Anxiety.  What changed: when to take this            CONTINUE taking these medications      dofetilide 250 MCG Cap  Commonly known as: TIKOSYN  Take 250 mcg by mouth every 12 (twelve) hours.     famotidine 10 MG tablet  Commonly known as: PEPCID  Take 10 mg by mouth 2 (two) times daily as needed for Heartburn.     isosorbide mononitrate 60 MG 24 hr tablet  Commonly known as: IMDUR  Take 1 tablet (60 mg total) by mouth every evening. Stop apresoline     metoprolol succinate 50 MG 24 hr tablet  Commonly known as: TOPROL-XL  Take 50 mg by mouth 2 (two) times daily.     NIFEdipine 30 MG Tbsr  Commonly known as: ADALAT CC  Take 30  mg by mouth every morning.     sertraline 50 MG tablet  Commonly known as: ZOLOFT  Take 50 mg by mouth.     traMADoL 50 mg tablet  Commonly known as: ULTRAM  Take 50 mg by mouth every 8 (eight) hours as needed for Pain.              Indwelling Lines/Drains at time of discharge:   Lines/Drains/Airways       None                   Time spent on the discharge of patient: 35 minutes         Erika Fine MD  Department of Hospital Medicine  [facility] - Intensive Care ([place]-14)

## 2023-10-04 NOTE — NURSING
DC paperwork given to son at bedside. Medications delivered to bedside. IV and tele removed. Pt received PRN hydralazine for a BP of 196/84. Re assessed /78. MD notified and asked if okay with pt to be DC'd and she said yes and instructed to tell pt to also check her BP Once she got home. Pt and son educated on checking BP when getting home. Wheelchair transportation initiated and pt will be riding home with son.

## 2023-10-04 NOTE — ASSESSMENT & PLAN NOTE
Patient with Paroxysmal (<7 days) atrial fibrillation which is controlled currently with Beta Blocker and dofetilide . Patient is currently in sinus rhythm.[id number] Score: 3. . Anticoagulation not indicated due to h/o bleeding.

## 2023-10-04 NOTE — NURSING
Pt had an uneventful night, pt did have some confusion throughout the night, however easily reoriented, pt is asleep in bed quietly, safety maintained, NAD noted.

## 2023-10-04 NOTE — ASSESSMENT & PLAN NOTE
Patient has recurrent depression which is moderate and is currently controlled. Will Continue anti-depressant medications. We will not consult psychiatry at this time. Patient does not display psychosis at this time. Continue to monitor closely and adjust plan of care as needed.  - continue home medication

## 2023-10-04 NOTE — ANESTHESIA POSTPROCEDURE EVALUATION
Anesthesia Post Evaluation    Patient: Ally Navarro    Procedure(s) Performed: Procedure(s) (LRB):  ERCP (ENDOSCOPIC RETROGRADE CHOLANGIOPANCREATOGRAPHY) (N/A)  ULTRASOUND, UPPER GI TRACT, ENDOSCOPIC (N/A)    Final Anesthesia Type: general      Patient location during evaluation: PACU  Patient participation: Yes- Able to Participate  Level of consciousness: awake and alert and oriented  Post-procedure vital signs: reviewed and stable  Pain management: adequate  Airway patency: patent    PONV status at discharge: No PONV  Anesthetic complications: no      Cardiovascular status: blood pressure returned to baseline and hemodynamically stable  Respiratory status: unassisted, spontaneous ventilation and room air  Hydration status: euvolemic  Follow-up not needed.          Vitals Value Taken Time   /83 10/04/23 1118   Temp 36.4 °C (97.6 °F) 10/04/23 1118   Pulse 70 10/04/23 1118   Resp 17 10/04/23 1118   SpO2 98 % 10/04/23 1118         Event Time   Out of Recovery 15:45:00         Pain/Pepito Score: Pain Rating Prior to Med Admin: 6 (10/3/2023  7:13 PM)  Pain Rating Post Med Admin: 3 (10/3/2023  7:13 PM)  Pepito Score: 10 (10/3/2023  3:00 PM)

## 2023-10-04 NOTE — ASSESSMENT & PLAN NOTE
Transferred from [facility] for management of acute biliary pancreatitis. Elevated Lipase and abnormal LFTs, lipase and LFTs down trending by 10/3. CT showed intra and extrahepatic biliary dilation. Abdominal pain controlled with prn morphine prior to procedure. Treated with empiric zosyn which was discontinued after procedure. Patient w/o evidence of infection.

## 2023-10-04 NOTE — HOSPITAL COURSE
Ms. Ally Navarro is a 81yo female with a history of anemia, chronic kidney disease IV (baseline Cr 2.5 - 3), COPD, atrial fibrillation/pacemaker placement (on dofetilide, not on AC) , hypertension, hyperlipidemia,  bladder cancer on remission, remote cholecystectomy, MCI who was admitted to [facility] on September 30 with upper abdominal pain, nausea, and poor appetite. She was found to have elevated lipase, elevated LFTs, elevated bilirubin and intra/extrahepatic ductal dilation on imaging. Patient's pacemaker is not MRI compatible. She was transferred to [facility] due to concern for biliary pancreatitis. She was taken for upper EUS on 10/3. No abnormalities or signs of pathology were found during the procedure. Patient's pain was well controlled w/o IV medications post-procedure. She is tolerating an normal diet and feels well enough for discharge on previous home medication. She plans to follow-up with her PCP within a couple weeks for hospital follow-up and blood pressure check.

## 2023-10-04 NOTE — ASSESSMENT & PLAN NOTE
Cronic and controlled at home. Patient did require prn hydralazine while hospitalized.   - continue home isosorbide, nifedipine and metoprolol

## 2023-10-05 ENCOUNTER — PES CALL (OUTPATIENT)
Dept: HOME HEALTH SERVICES | Facility: CLINIC | Age: 83
End: 2023-10-05
Payer: MEDICARE

## 2023-10-05 NOTE — PLAN OF CARE
[facility] - Intensive Care ([facility])  Discharge Final Note    Primary Care Provider: Stevo Berry MD    Expected Discharge Date: 10/4/2023    Final Discharge Note (most recent)       Final Note - 10/03/23 1500          Final Note    Assessment Type Discharge Planning Assessment        Post-Acute Status    Coverage Medicare A& B                                Contact Info       Arminda Parsons MD   Specialty: Family Medicine    [address]  [facility] [address]   Phone: [phone]       Next Steps: Follow up    Instructions: Next Steps: Follow up on 10/6/2023  Appointment:   Instructions: October 6th @ 10:00 Please arrive 15 minutes early.          Future Appointments   Date Time Provider Department Center   11/27/2023 10:30 AM [facility]   12/4/2023  1:00 PM Sanjuana Yun MD [facility] met with patient and discussed discharge plans, patient to [place] home with no needs. Patients medications delivered to bedside. No HME/DME requested/recommended. And follow up appointment[s] scheduled.   Transportation provided by family via private vehicle.    Leighann Lynch RN  Case Management  [facility]  [phone]

## 2023-10-06 ENCOUNTER — PES CALL (OUTPATIENT)
Dept: HOME HEALTH SERVICES | Facility: CLINIC | Age: 83
End: 2023-10-06
Payer: MEDICARE

## 2023-10-06 ENCOUNTER — PATIENT OUTREACH (OUTPATIENT)
Dept: ADMINISTRATIVE | Facility: CLINIC | Age: 83
End: 2023-10-06
Payer: MEDICARE

## 2023-10-06 NOTE — PROGRESS NOTES
C3 nurse attempted to contact Ally Navarro for a TCC post hospital discharge follow up call. No answer. Left voicemail with callback information. The patient has a scheduled HOSFU appointment with Arminda Parsons MD on 10/06/23 @ 3402.

## 2023-10-09 NOTE — PROGRESS NOTES
C3 nurse spoke with Ally Navarro's son Cuong for a TCC post hospital discharge follow up call. The patient has a scheduled HOSFU appointment with Stevo Berry MD on 10/18/23 @ 0900.    The HOSPFU on 10/06/23 with Arminda Parsons MD was cancelled by the pt/son who scheduled HOSPFU with their PCP according to their schedules.

## 2024-03-27 PROBLEM — S32.020A COMPRESSION FRACTURE OF L2: Status: ACTIVE | Noted: 2024-03-27
